# Patient Record
Sex: FEMALE | Race: WHITE | NOT HISPANIC OR LATINO | Employment: OTHER | URBAN - METROPOLITAN AREA
[De-identification: names, ages, dates, MRNs, and addresses within clinical notes are randomized per-mention and may not be internally consistent; named-entity substitution may affect disease eponyms.]

---

## 2017-01-17 ENCOUNTER — HOSPITAL ENCOUNTER (OUTPATIENT)
Dept: RADIOLOGY | Facility: MEDICAL CENTER | Age: 72
End: 2017-01-17
Attending: SURGERY
Payer: MEDICARE

## 2017-01-17 DIAGNOSIS — Z13.9 SCREENING: ICD-10-CM

## 2017-01-17 PROCEDURE — 77063 BREAST TOMOSYNTHESIS BI: CPT

## 2017-01-20 ENCOUNTER — HOSPITAL ENCOUNTER (OUTPATIENT)
Dept: RADIOLOGY | Facility: MEDICAL CENTER | Age: 72
End: 2017-01-20
Attending: SURGERY
Payer: MEDICARE

## 2017-01-20 DIAGNOSIS — R92.8 ABNORMAL MAMMOGRAM OF LEFT BREAST: ICD-10-CM

## 2017-01-20 PROCEDURE — G0206 DX MAMMO INCL CAD UNI: HCPCS | Mod: LT

## 2017-01-23 ENCOUNTER — HOSPITAL ENCOUNTER (OUTPATIENT)
Dept: RADIOLOGY | Facility: MEDICAL CENTER | Age: 72
End: 2017-01-23
Attending: SURGERY
Payer: MEDICARE

## 2017-01-23 ENCOUNTER — TELEPHONE (OUTPATIENT)
Dept: RADIOLOGY | Facility: MEDICAL CENTER | Age: 72
End: 2017-01-23

## 2017-01-26 ENCOUNTER — HOSPITAL ENCOUNTER (OUTPATIENT)
Dept: RADIOLOGY | Facility: MEDICAL CENTER | Age: 72
End: 2017-01-26
Attending: SURGERY
Payer: MEDICARE

## 2017-01-26 DIAGNOSIS — R92.1 BREAST CALCIFICATION, LEFT: ICD-10-CM

## 2017-01-26 PROCEDURE — A4648 IMPLANTABLE TISSUE MARKER: HCPCS

## 2017-01-26 PROCEDURE — 88305 TISSUE EXAM BY PATHOLOGIST: CPT

## 2017-02-15 ENCOUNTER — SLEEP CENTER VISIT (OUTPATIENT)
Dept: SLEEP MEDICINE | Facility: MEDICAL CENTER | Age: 72
End: 2017-02-15
Payer: MEDICARE

## 2017-02-15 VITALS
HEIGHT: 64 IN | HEART RATE: 79 BPM | RESPIRATION RATE: 16 BRPM | BODY MASS INDEX: 24.59 KG/M2 | WEIGHT: 144 LBS | SYSTOLIC BLOOD PRESSURE: 118 MMHG | DIASTOLIC BLOOD PRESSURE: 70 MMHG | OXYGEN SATURATION: 96 %

## 2017-02-15 DIAGNOSIS — G47.33 OSA (OBSTRUCTIVE SLEEP APNEA): ICD-10-CM

## 2017-02-15 DIAGNOSIS — C50.919 MALIGNANT NEOPLASM OF OTHER SPECIFIED SITES OF FEMALE BREAST: ICD-10-CM

## 2017-02-15 PROCEDURE — 99203 OFFICE O/P NEW LOW 30 MIN: CPT | Performed by: INTERNAL MEDICINE

## 2017-02-15 RX ORDER — ANASTROZOLE 1 MG/1
1 TABLET ORAL
COMMUNITY
Start: 2017-01-27 | End: 2023-11-18

## 2017-02-15 RX ORDER — ZOLPIDEM TARTRATE 5 MG/1
5 TABLET ORAL NIGHTLY PRN
Qty: 3 TAB | Refills: 0 | Status: SHIPPED | OUTPATIENT
Start: 2017-02-15 | End: 2019-05-09

## 2017-02-15 RX ORDER — SERTRALINE HYDROCHLORIDE 100 MG/1
1 TABLET, FILM COATED ORAL DAILY
COMMUNITY
Start: 2016-11-28

## 2017-02-15 NOTE — MR AVS SNAPSHOT
"Roseline Redding   2/15/2017 1:00 PM   Sleep Center Visit   MRN: [id number]    Department:  [facility]   Dept Phone:  [phone]    Description:  Female : 1945   Provider:  Noris Mo M.D.           Reason for Visit     New Patient Evaluate CLAUDIA      Allergies as of 2/15/2017     Allergen Noted Reactions    Codeine 2010       dizziness      You were diagnosed with     CLAUDIA (obstructive sleep apnea)   [194585]         Vital Signs     Blood Pressure Pulse Respirations Height Weight Body Mass Index    118/70 mmHg 79 16 1.626 m (5' 4.02\") 65.318 kg (144 lb) 24.71 kg/m2    Oxygen Saturation Smoking Status                96% Former Smoker          Basic Information     Date Of Birth Sex Race Ethnicity Preferred Language    1945 Female White Non- English      Your appointments     Mar 28, 2017  8:00 PM   Sleep Study Diagnostic with SLEEP TECH   [facility] (--)    [address]  [place] [employer] [employer] [place] [address]   [phone]            2017  2:40 PM   Follow UP with Noris Mo M.D.   [facility] (--)    [address]  [place] [place] [place] [place] [address]   [phone]            May 03, 2017 12:30 PM   US [place]EDEE with [address] BREAST [facility] [address]    [address]  [place] [address]   [phone]           Check in 30 minutes prior.              Problem List              ICD-10-CM Priority Class Noted - Resolved    Malignant neoplasm of other specified sites of female breast (CMS-HCC) C50.919   10/19/2012 - Present      Health Maintenance     Patient has no pending health maintenance at this time      Current Immunizations     No immunizations on file.      Below and/or attached are the medications your provider expects you to take. Review all of your home medications and newly ordered medications with your provider and/or pharmacist. Follow medication instructions " as directed by your provider and/or pharmacist. Please keep your medication list with you and share with your provider. Update the information when medications are discontinued, doses are changed, or new medications (including over-the-counter products) are added; and carry medication information at all times in the event of emergency situations     Allergies:  CODEINE - (reactions not documented)               Medications  Valid as of: February 15, 2017 -  1:52 PM    Generic Name Brand Name Tablet Size Instructions for use    Anastrozole (Tab) ARIMIDEX 1 MG Take 1 Tab by mouth every day.        Sertraline HCl (Tab) ZOLOFT 100 MG Take 1 Tab by mouth every day.        Zolpidem Tartrate (Tab) AMBIEN 5 MG Take 1 Tab by mouth at bedtime as needed for Sleep. Take up to 3 tablets at bedtime for sleep study        .                 Medicines prescribed today were sent to:     [id number] - [place], [place] - [address]    [address] [place] [address]    Phone: [phone] Fax: [phone]    Open 24 Hours?: No      Medication refill instructions:       If your prescription bottle indicates you have medication refills left, it is not necessary to call your provider’s office. Please contact your pharmacy and they will refill your medication.    If your prescription bottle indicates you do not have any refills left, you may request refills at any time through one of the following ways: The online [email or web address] system (except Urgent Care), by calling your provider’s office, or by asking your pharmacy to contact your provider’s office with a refill request. Medication refills are processed only during regular business hours and may not be available until the next business day. Your provider may request additional information or to have a follow-up visit with you prior to refilling your medication.   *Please Note: Medication refills are assigned a new Rx number when refilled electronically. Your pharmacy may indicate that no  refills were authorized even though a new prescription for the same medication is available at the pharmacy. Please request the medicine by name with the pharmacy before contacting your provider for a refill.        Your To Do List     Future Labs/Procedures Complete By Expires    POLYSOMNOGRAPHY, 4 OR MORE  As directed 2/15/2018         [employer] Access Code: [email or web address]  Expires: 3/17/2017  1:52 PM    [employer]  A secure, online tool to manage your health information     [employer]® is a secure, online tool that connects you to your personalized health information from the privacy of your home -- day or night - making it very easy for you to manage your healthcare. Once the activation process is completed, you can even access your medical information using the [employer] arianna, which is available for free in the Apple Arianna store or Google Play store.     [employer] provides the following levels of access (as shown below):   My Chart Features   [facility] Primary Care Doctor [facility]  Specialists [facility]  Urgent  Care Non-[facility]  Primary Care  Doctor   Email your healthcare team securely and privately 24/7 X X X    Manage appointments: schedule your next appointment; view details of past/upcoming appointments X      Request prescription refills. X      View recent personal medical records, including lab and immunizations X X X X   View health record, including health history, allergies, medications X X X X   Read reports about your outpatient visits, procedures, consult and ER notes X X X X   See your discharge summary, which is a recap of your hospital and/or ER visit that includes your diagnosis, lab results, and care plan. X X       How to register for [employer]  1. Go to  [email or web address].  2. Click on the Sign Up Now box, which takes you to the New Member Sign Up page. You will need to provide the following information:  a. Enter your [employer] Access Code exactly as it appears at the top of this page.  (You will not need to use this code after you’ve completed the sign-up process. If you do not sign up before the expiration date, you must request a new code.)   b. Enter your date of birth.   c. Enter your home email address.   d. Click Submit, and follow the next screen’s instructions.  3. Create a [employer] ID. This will be your [employer] login ID and cannot be changed, so think of one that is secure and easy to remember.  4. Create a [employer] password. You can change your password at any time.  5. Enter your Password Reset Question and Answer. This can be used at a later time if you forget your password.   6. Enter your e-mail address. This allows you to receive e-mail notifications when new information is available in [employer].  7. Click Sign Up. You can now view your health information.    For assistance activating your [employer] account, call [phone]

## 2017-02-15 NOTE — PROGRESS NOTES
Chief Complaint   Patient presents with   • New Patient     Evaluate CLAUDIA       HPI: This patient is a 71 y.o. Female who is referred by her dentist for obstructive sleep apnea syndrome. She has had bruxism for many years, and her dentist had her perform an overnight home sleep study which showed RDI of 32 events per hour, with no associated desaturations suggestive of sleep apnea. She admits to snoring for many years, and has been told by her daughter that she has apneas when they have shared a bedroom. She sleeps in a separate bedroom from her , and consequently is unaware of these symptoms. She goes to bed by 9 PM, falling asleep within 10 minutes with the use of melatonin. She has 1-2 nighttime awakenings, resumes sleep, gets up at 6 AM feeling tired. Her [place] sleepiness score is 6. She denies tobacco, excessive alcohol or caffeine use. Her BMI is 24.  She has researched into obstructive sleep apnea syndrome, and is concerned about the associated medical comorbidities including possible Alzheimer's disease.      Past Medical History   Diagnosis Date   • Unspecified disorder of thyroid    • CATARACT    • Pain    • Arrhythmia      MVP   • Glaucoma    • Indigestion    • Snoring    • Arthritis      knees/rt thumb   • Unspecified urinary incontinence      sneezing/coughing   • Cancer (CMS-HCC)      breast   • Heart valve disease      MVP   • Breast cancer (CMS-HCC)    • Allergic rhinitis    • Bronchitis    • Bruxism    • GERD (gastroesophageal reflux disease)    • Hypothyroidism    • Restless leg syndrome    • TMJ (dislocation of temporomandibular joint)    • Chickenpox    • Yemeni measles    • Influenza    • Mumps    • Tonsillitis    • Whooping cough        Social History     Social History   • Marital Status:      Spouse Name: N/A   • Number of Children: N/A   • Years of Education: N/A     Occupational History   • Not on file.     Social History Main Topics   • Smoking status: Former Smoker -- 1.50  "packs/day for 15 years     Types: Cigarettes     Quit date: 02/13/1983   • Smokeless tobacco: Never Used      Comment: 1.5 pks  a day for a total of 15 yrs   • Alcohol Use: Yes      Comment: Occasionally   • Drug Use: No   • Sexual Activity: Not on file     Other Topics Concern   • Not on file     Social History Narrative       Family History   Problem Relation Age of Onset   • Lung Cancer Father    • Hypertension Brother    • No Known Problems Daughter    • Sleep Apnea Neg Hx        No current outpatient prescriptions on file prior to visit.     No current facility-administered medications on file prior to visit.       Allergies: Codeine    ROS:   Constitutional: Denies fevers, chills, night sweats, fatigue or weight loss  Eyes: Denies vision loss, pain, drainage, double vision  Ears, Nose, Throat: Denies earache, difficulty hearing, tinnitus, nasal congestion, hoarseness, +bruxism  Cardiovascular: Denies chest pain, tightness, palpitations, orthopnea or edema  Respiratory: Denies shortness of breath, cough, wheezing, hemoptysis  Sleep: As in history of present illness  GI: Denies heartburn, dysphagia, nausea, abdominal pain, diarrhea or constipation  : Denies frequent urination, hematuria, discharge or painful urination  Musculoskeletal: Denies back pain, painful joints, sore muscles  Neurological: Denies weakness or headaches  Skin: No rashes    Blood pressure 118/70, pulse 79, resp. rate 16, height 1.626 m (5' 4.02\"), weight 65.318 kg (144 lb), SpO2 96 %.  Multi-Ox Readings  Multi Ox #1     O2 sat % at rest     O2 sat % on exertion     O2 sat average on exertion     Multi Ox #2     O2 sat % at rest     O2 sat % on exertion     O2 sat average on exertion       Oxygen Use     Oxygen Frequency     Duration of need     Is the patient mobile within the home?     CPAP Use?     BIPAP Use?     Servo Titration         Physical Exam:  Appearance: Well-nourished, well-developed, in no acute distress  HEENT: " Normocephalic, atraumatic, white sclera, PERRLA, oropharynx clear, Mallampati 3  Neck: No adenopathy or masses  Respiratory: no intercostal retractions or accessory muscle use  Lungs auscultation: Clear to auscultation bilaterally  Cardiovascular: Regular rate rhythm. No murmurs, rubs or gallops.  No LE edema  Abdomen: soft, nondistended  Gait: Normal  Digits: No clubbing, cyanosis  Motor: No focal deficits  Orientation: Oriented to time, person and place    Diagnosis:  1. CLAUDIA (obstructive sleep apnea)  POLYSOMNOGRAPHY, 4 OR MORE    zolpidem (AMBIEN) 5 MG Tab   2. Malignant neoplasm of other specified sites of female breast (CMS-HCC)         Plan:  The patient has snoring and witnessed apneas, in the setting of a crowded airway, with high clinical suspicion for obstructive sleep apnea syndrome. She had overnight screening home polysomnography performed by her dentist which was suggestive of severe CLAUDIA although no desaturations were appreciated.  We discussed the pathophysiology of sleep apnea, its relation to various other medical comorbidities including cardiovascular and neurologic disease, as well as treatment options.  She is very interested in pursuing evaluation, and we will arrange for polysomnography in the sleep laboratory for diagnosis and treatment.   Return for after testing.

## 2017-03-28 ENCOUNTER — APPOINTMENT (OUTPATIENT)
Dept: SLEEP MEDICINE | Facility: MEDICAL CENTER | Age: 72
End: 2017-03-28
Attending: INTERNAL MEDICINE
Payer: MEDICARE

## 2017-05-03 ENCOUNTER — HOSPITAL ENCOUNTER (OUTPATIENT)
Dept: RADIOLOGY | Facility: MEDICAL CENTER | Age: 72
End: 2017-05-03
Attending: SURGERY
Payer: COMMERCIAL

## 2017-05-03 DIAGNOSIS — R92.30 DENSE BREAST TISSUE: ICD-10-CM

## 2017-05-03 PROCEDURE — 4410555 US-SCREENING BREAST (SONOCINE - SP)

## 2017-05-08 ENCOUNTER — SLEEP STUDY (OUTPATIENT)
Dept: SLEEP MEDICINE | Facility: MEDICAL CENTER | Age: 72
End: 2017-05-08
Attending: INTERNAL MEDICINE
Payer: MEDICARE

## 2017-05-08 DIAGNOSIS — G47.33 OSA (OBSTRUCTIVE SLEEP APNEA): ICD-10-CM

## 2017-05-08 PROCEDURE — 95811 POLYSOM 6/>YRS CPAP 4/> PARM: CPT | Performed by: INTERNAL MEDICINE

## 2017-05-09 NOTE — PROCEDURES
CLINICAL COMMENTS:  The patient underwent a split night polysomnogram with a CPAP titration using the standard montage for measurement of parameters of sleep, respiratory events, movement abnormalities, heart rate and rhythm. A microphone was used to monitor snoring.    INTERPRETATION: The diagnostic recording time was 220.1 minutes with a sleep period of 186.2 minutes.  Total sleep time was 152.5 minutes with a sleep efficiency of 69.3%.  The sleep latency was 33.9 minutes, and REM latency was N/A minutes.  The patient had 98 arousals in total, for an arousal index of 38.6.        RESPIRATORY: The patient had 93 apneas in total.  Of these, 77 were obstructive apneas, and 15 were central apneas.  This resulted in an apnea index (AI) of 36.6.  The patient had 35 hypopneas in total, which resulted in a hypopnea index of 13.8.  The overall AHI was 50.4, while the AHI during REM was N/A.  The supine AHI = 111.7.    OXIMETRY: Oxygen saturation monitoring showed a mean SpO2 of 93.3% for the diagnostic part of the study, with a minimum oxygen saturation of 83.0%.  Oxygen saturations were below 89% for 4.2% of sleep time.    CARDIAC: The highest heart rate for the first part of the study was 88.0 beats per minute.  The average heart rate during sleep was 73.2 bpm, while the highest heart rate was 85.0 bpm.    LIMB MOVEMENTS: There were a total of 233 periodic limb movements during sleep, of which 5 were PLMS arousals.  This resulted in a PLMS index of 91.7 and a PLMS arousal index of 2.0.    TREATMENT    Treatment recording time was 190.5 minutes with a total sleep time of 152.0min.  The patient had an arousal index of 4.7.      RESPIRATORY: The patient had 1 obstructive apneas, 1 central apneas, and 13 hypopneas for an overall AHI was 5.9.    OXIMETRY: The mean SpO2 during treatment was 94.2%, with a minimum oxygen saturation of 83.0%.      CPAP was tried from 5cm to 6cmH2O.    In the diagnostic phase of the study the  patient had sleep efficiency of 69.3%. No REM sleep was noted. The patient had an increased arousal and awakening index. The patient had a limb movement index of 91.7 events per hour. The patient had an apnea hypotony index of 50.4 events per hour establishing the diagnosis of obstructive sleep apnea. She had mild hypoxemia with oxygen desaturations down to about 83% briefly.    The patient was then titrated on CPAP from 5-6 cm of water pressure. At 6 cm of water pressure CPAP the patient was able to achieve REM sleep with a respiratory disturbance index of 4.3 events per hour and improved oxygenation.    The patient is a candidate for continued airway pressurization therapy utilizing CPAP at 6 cm of water pressure.

## 2017-05-12 ENCOUNTER — SLEEP CENTER VISIT (OUTPATIENT)
Dept: SLEEP MEDICINE | Facility: MEDICAL CENTER | Age: 72
End: 2017-05-12
Payer: MEDICARE

## 2017-05-12 VITALS
SYSTOLIC BLOOD PRESSURE: 122 MMHG | WEIGHT: 147 LBS | HEIGHT: 64 IN | HEART RATE: 75 BPM | DIASTOLIC BLOOD PRESSURE: 72 MMHG | OXYGEN SATURATION: 94 % | RESPIRATION RATE: 16 BRPM | BODY MASS INDEX: 25.1 KG/M2

## 2017-05-12 DIAGNOSIS — G47.33 OSA (OBSTRUCTIVE SLEEP APNEA): ICD-10-CM

## 2017-05-12 PROCEDURE — 99213 OFFICE O/P EST LOW 20 MIN: CPT | Performed by: INTERNAL MEDICINE

## 2017-05-12 RX ORDER — LEVOTHYROXINE SODIUM 0.05 MG/1
50 TABLET ORAL DAILY
COMMUNITY
Start: 2017-04-03 | End: 2022-10-04

## 2017-05-12 NOTE — PROGRESS NOTES
Chief Complaint   Patient presents with   • Follow-Up     SS results   HPI: This patient is a 72 y.o. Female who returns to discuss sleep study results. She has had bruxism for many years, and her dentist had her perform an overnight home sleep study which showed RDI of 32 events per hour, with no associated desaturations, suggestive of sleep apnea. She admits to snoring for many years, and has been told by her daughter that she has apneas when they have shared a bedroom. Polysomnography in the sleep laboratory confirmed severe CLAUDIA with AHI 50 events per hour associated with mild hypoxemia with a brief desaturation into the 80s% Sp02. The patient was titrated on CPAP: 6 cm of water with resolution of AHI and hypoxemia.  She is very amenable to CPAP use.      Past Medical History   Diagnosis Date   • Unspecified disorder of thyroid    • CATARACT    • Pain    • Arrhythmia      MVP   • Glaucoma    • Indigestion    • Snoring    • Arthritis      knees/rt thumb   • Unspecified urinary incontinence      sneezing/coughing   • Cancer (CMS-HCC)      breast   • Heart valve disease      MVP   • Breast cancer (CMS-[facility])    • Allergic rhinitis    • Bronchitis    • Bruxism    • GERD (gastroesophageal reflux disease)    • Hypothyroidism    • Restless leg syndrome    • TMJ (dislocation of temporomandibular joint)    • Chickenpox    • Sudanese measles    • Influenza    • Mumps    • Tonsillitis    • Whooping cough        Social History     Social History   • Marital Status:      Spouse Name: N/A   • Number of Children: N/A   • Years of Education: N/A     Occupational History   • Not on file.     Social History Main Topics   • Smoking status: Former Smoker -- 1.50 packs/day for 15 years     Types: Cigarettes     Quit date: 02/13/1983   • Smokeless tobacco: Never Used      Comment: 1.5 pks  a day for a total of 15 yrs   • Alcohol Use: Yes      Comment: Occasionally   • Drug Use: No   • Sexual Activity: Not on file     Other Topics  "Concern   • Not on file     Social History Narrative       Family History   Problem Relation Age of Onset   • Lung Cancer Father    • Hypertension Brother    • No Known Problems Daughter    • Sleep Apnea Neg Hx        Current Outpatient Prescriptions on File Prior to Visit   Medication Sig Dispense Refill   • anastrozole (ARIMIDEX) 1 MG Tab Take 1 Tab by mouth every day.     • sertraline (ZOLOFT) 100 MG Tab Take 1 Tab by mouth every day.     • zolpidem (AMBIEN) 5 MG Tab Take 1 Tab by mouth at bedtime as needed for Sleep. Take up to 3 tablets at bedtime for sleep study 3 Tab 0     No current facility-administered medications on file prior to visit.       Allergies: Codeine    ROS:   Constitutional: Denies fevers, chills, night sweats, fatigue or weight loss  Eyes: Denies vision loss, pain, drainage, double vision  Ears, Nose, Throat: Denies earache, difficulty hearing, tinnitus, nasal congestion, hoarseness  Cardiovascular: Denies chest pain, tightness, palpitations, orthopnea or edema  Respiratory: Denies shortness of breath, cough, wheezing, hemoptysis  Sleep: As in history of present illness  GI: Denies heartburn, dysphagia, nausea, abdominal pain, diarrhea or constipation  : Denies frequent urination, hematuria, discharge or painful urination  Musculoskeletal: Denies back pain, painful joints, sore muscles  Neurological: Denies weakness or headaches  Skin: No rashes    Blood pressure 122/72, pulse 75, resp. rate 16, height 1.626 m (5' 4\"), weight 66.679 kg (147 lb), SpO2 94 %.  Multi-Ox Readings  Multi Ox #1     O2 sat % at rest     O2 sat % on exertion     O2 sat average on exertion     Multi Ox #2     O2 sat % at rest     O2 sat % on exertion     O2 sat average on exertion       Oxygen Use     Oxygen Frequency     Duration of need     Is the patient mobile within the home?     CPAP Use?     BIPAP Use?     Servo Titration         Physical Exam:  Appearance: Well-nourished, well-developed, in no acute " distress  HEENT: Normocephalic, atraumatic, white sclera, PERRLA, Mallampati 3  Neck: No masses  Respiratory: no intercostal retractions or accessory muscle use  Lungs auscultation: No audible wheezing  Cardiovascular: No LE edema  Abdomen: soft, nondistended  Gait: Normal  Digits: No clubbing, cyanosis  Motor: + Tremors  Orientation: Oriented to time, person and place    Diagnosis:  1. CLAUDIA (obstructive sleep apnea)         Plan:  The patient has severe obstructive sleep apnea, and would benefit from CPAP therapy. She is highly amenable to CPAP use. Set up CPAP: 6 cm of water using a small Dreamwear nasal mask with heated humidification.  We will download compliance card on follow-up to monitor response to therapy.  Return in about 8 weeks (around 7/7/2017).

## 2017-05-12 NOTE — MR AVS SNAPSHOT
"        Roseline Redding   2017 9:00 AM   Sleep Center Visit   MRN: [id number]    Department:  [facility]   Dept Phone:  [phone]    Description:  Female : 1945   Provider:  Noris Mo M.D.           Reason for Visit     Follow-Up SS results      Allergies as of 2017     Allergen Noted Reactions    Codeine 2010       dizziness      You were diagnosed with     CLAUDIA (obstructive sleep apnea)   [418332]         Vital Signs     Blood Pressure Pulse Respirations Height Weight Body Mass Index    122/72 mmHg 75 16 1.626 m (5' 4\") 66.679 kg (147 lb) 25.22 kg/m2    Oxygen Saturation Smoking Status                94% Former Smoker          Basic Information     Date Of Birth Sex Race Ethnicity Preferred Language    1945 Female White Non- English      Your appointments     May 23, 2017  1:00 PM   BONE DENSITY (DEXASCAN) with [facility] BD 1   [facility] ([address])    [address] [place] [address] [address]  [place] [address] [phone]           No calcium supplements 24 hours prior to exam, including antacids or multivitamins w/calcium.  Pt may eat and drink normally.  No injection procedures prior to Dexa on the same day.  No barium contrast, no CTs with IV or oral contrast, no Pet/CTs and no Nuc Med injections for 7 days prior to a Dexa (including Barium Swallow, Upper GI, Small Bowel Series).  If scheduling a Dexa on the same day as a CT with IV or oral contrast, any test with barium, Pet/CT or a Nuc Med with injection, always schedule the Dexa before the other study.            2017  1:20 PM   Follow UP with Noris Mo M.D.   [facility] (--)    [address]  [place] [id number] [phone]              Problem List              ICD-10-CM Priority Class Noted - Resolved    Malignant neoplasm of other specified sites of female breast C50.919   10/19/2012 - Present      Health Maintenance        Date " Due Completion Dates    IMM DTaP/Tdap/Td Vaccine (1 - Tdap) 2/27/1964 ---    PAP SMEAR 2/27/1966 ---    COLONOSCOPY 2/27/1995 ---    IMM ZOSTER VACCINE 2/27/2005 ---    IMM PNEUMOCOCCAL 65+ (ADULT) LOW/MEDIUM RISK SERIES (1 of 2 - PCV13) 2/27/2010 ---    MAMMOGRAM 1/20/2018 1/20/2017, 1/17/2017, 12/11/2015, 10/7/2014, 10/3/2013, 9/20/2012, 9/18/2012, 2/10/2011, 9/11/2009, 9/11/2009, 11/16/2007, 11/16/2007, 11/16/2006, 11/16/2006    BONE DENSITY 10/7/2019 10/7/2014            Current Immunizations     No immunizations on file.      Below and/or attached are the medications your provider expects you to take. Review all of your home medications and newly ordered medications with your provider and/or pharmacist. Follow medication instructions as directed by your provider and/or pharmacist. Please keep your medication list with you and share with your provider. Update the information when medications are discontinued, doses are changed, or new medications (including over-the-counter products) are added; and carry medication information at all times in the event of emergency situations     Allergies:  CODEINE - (reactions not documented)               Medications  Valid as of: May 12, 2017 -  9:40 AM    Generic Name Brand Name Tablet Size Instructions for use    Anastrozole (Tab) ARIMIDEX 1 MG Take 1 Tab by mouth every day.        Fexofenadine HCl   Take  by mouth.        Levothyroxine Sodium (Tab) SYNTHROID 50 MCG         Sertraline HCl (Tab) ZOLOFT 100 MG Take 1 Tab by mouth every day.        Zolpidem Tartrate (Tab) AMBIEN 5 MG Take 1 Tab by mouth at bedtime as needed for Sleep. Take up to 3 tablets at bedtime for sleep study        .                 Medicines prescribed today were sent to:     [id number] - [place] - [address]    [address] [place] [address]    Phone: [phone] Fax: [phone]    Open 24 Hours?: No      Medication refill instructions:       If your prescription bottle indicates you  have medication refills left, it is not necessary to call your provider’s office. Please contact your pharmacy and they will refill your medication.    If your prescription bottle indicates you do not have any refills left, you may request refills at any time through one of the following ways: The online Nexamp system (except Urgent Care), by calling your provider’s office, or by asking your pharmacy to contact your provider’s office with a refill request. Medication refills are processed only during regular business hours and may not be available until the next business day. Your provider may request additional information or to have a follow-up visit with you prior to refilling your medication.   *Please Note: Medication refills are assigned a new Rx number when refilled electronically. Your pharmacy may indicate that no refills were authorized even though a new prescription for the same medication is available at the pharmacy. Please request the medicine by name with the pharmacy before contacting your provider for a refill.           Nexamp Access Code: [email or web address]  Expires: 6/2/2017 12:13 PM    Nexamp  A secure, online tool to manage your health information     [facility]’s Nexamp® is a secure, online tool that connects you to your personalized health information from the privacy of your home -- day or night - making it very easy for you to manage your healthcare. Once the activation process is completed, you can even access your medical information using the Nexamp arianna, which is available for free in the Apple Arianna store or Google Play store.     Nexamp provides the following levels of access (as shown below):   My Chart Features   [facility] Doctor [facility]  Specialists [facility]  Urgent  Care Non-[facility]  Primary Care  Doctor   Email your healthcare team securely and privately 24/7 X X X    Manage appointments: schedule your next appointment; view details of past/upcoming appointments X       Request prescription refills. X      View recent personal medical records, including lab and immunizations X X X X   View health record, including health history, allergies, medications X X X X   Read reports about your outpatient visits, procedures, consult and ER notes X X X X   See your discharge summary, which is a recap of your hospital and/or ER visit that includes your diagnosis, lab results, and care plan. X X       How to register for IPextreme:  1. Go to  [email or web address].  2. Click on the Sign Up Now box, which takes you to the New Member Sign Up page. You will need to provide the following information:  a. Enter your IPextreme Access Code exactly as it appears at the top of this page. (You will not need to use this code after you’ve completed the sign-up process. If you do not sign up before the expiration date, you must request a new code.)   b. Enter your date of birth.   c. Enter your home email address.   d. Click Submit, and follow the next screen’s instructions.  3. Create a IPextreme ID. This will be your IPextreme login ID and cannot be changed, so think of one that is secure and easy to remember.  4. Create a IPextreme password. You can change your password at any time.  5. Enter your Password Reset Question and Answer. This can be used at a later time if you forget your password.   6. Enter your e-mail address. This allows you to receive e-mail notifications when new information is available in IPextreme.  7. Click Sign Up. You can now view your health information.    For assistance activating your IPextreme account, call [phone]

## 2017-05-23 ENCOUNTER — HOSPITAL ENCOUNTER (OUTPATIENT)
Dept: RADIOLOGY | Facility: MEDICAL CENTER | Age: 72
End: 2017-05-23
Attending: INTERNAL MEDICINE
Payer: MEDICARE

## 2017-05-23 DIAGNOSIS — M89.9 DISORDER OF BONE AND CARTILAGE, UNSPECIFIED: ICD-10-CM

## 2017-05-23 DIAGNOSIS — M94.9 DISORDER OF BONE AND CARTILAGE, UNSPECIFIED: ICD-10-CM

## 2017-05-23 PROCEDURE — 77080 DXA BONE DENSITY AXIAL: CPT

## 2017-07-26 ENCOUNTER — SLEEP CENTER VISIT (OUTPATIENT)
Dept: SLEEP MEDICINE | Facility: MEDICAL CENTER | Age: 72
End: 2017-07-26
Payer: MEDICARE

## 2017-07-26 VITALS
HEIGHT: 64 IN | DIASTOLIC BLOOD PRESSURE: 64 MMHG | RESPIRATION RATE: 16 BRPM | BODY MASS INDEX: 25.1 KG/M2 | OXYGEN SATURATION: 93 % | HEART RATE: 85 BPM | WEIGHT: 147 LBS | SYSTOLIC BLOOD PRESSURE: 110 MMHG

## 2017-07-26 DIAGNOSIS — G47.33 OSA ON CPAP: ICD-10-CM

## 2017-07-26 PROCEDURE — 99213 OFFICE O/P EST LOW 20 MIN: CPT | Performed by: INTERNAL MEDICINE

## 2017-07-26 NOTE — Clinical Note
Noris Mo M.D.  [facility]   [address] [place] [address]  Phone: [phone] - Fax: [phone]           Encounter Date: 7/26/2017  Provider: Noris Mo M.D.  Location of Care: [facility] SLEEP [facility]      Patient:   Roseline Redding   MR Number: [id number]   YOB: 1945     PROGRESS NOTE:  Chief Complaint   Patient presents with   • Follow-Up     CLAUDIA       HPI: This patient is a 72 y.o. Female who returns for follow-up of severe CLAUDIA. To reiterate, polysomnography in the sleep laboratory showed AHI of 50 events per hour associated with mild hypoxemia, consistent with severe CLAUDIA. She was started on CPAP: 6 cm of water and presents today for CPAP compliance check. Her compliance card confirms 100% CPAP usage for almost 6 hours nightly, and average AHI of 14.9. She is using nasal pillows however finds the headgear frequently slips off. She admits there is a learning curve to wearing CPAP, however is very motivated to continue CPAP use.   She require surgical clearance for orthopedic surgery.    Past Medical History   Diagnosis Date   • Unspecified disorder of thyroid    • CATARACT    • Pain    • Arrhythmia      MVP   • Glaucoma    • Indigestion    • Snoring    • Arthritis      knees/rt thumb   • Unspecified urinary incontinence      sneezing/coughing   • Cancer (CMS-HCC)      breast   • Heart valve disease      MVP   • Breast cancer (CMS-[facility])    • Allergic rhinitis    • Bronchitis    • Bruxism    • GERD (gastroesophageal reflux disease)    • Hypothyroidism    • Restless leg syndrome    • TMJ (dislocation of temporomandibular joint)    • Chickenpox    • Albanian measles    • Influenza    • Mumps    • Tonsillitis    • Whooping cough        Social History     Social History   • Marital Status:      Spouse Name: N/A   • Number of Children: N/A   • Years of Education: N/A     Occupational History   • Not on file.          Social History Main Topics   • Smoking status: Former Smoker -- 1.50 packs/day for 15 years     Types: Cigarettes     Quit date: 02/13/1983   • Smokeless tobacco: Never Used      Comment: 1.5 pks  a day for a total of 15 yrs   • Alcohol Use: Yes      Comment: Occasionally   • Drug Use: No   • Sexual Activity: Not on file     Other Topics Concern   • Not on file     Social History Narrative       Family History   Problem Relation Age of Onset   • Lung Cancer Father    • Hypertension Brother    • No Known Problems Daughter    • Sleep Apnea Neg Hx        Current Outpatient Prescriptions on File Prior to Visit   Medication Sig Dispense Refill   • levothyroxine (SYNTHROID) 50 MCG Tab      • Fexofenadine HCl (ALLEGRA PO) Take  by mouth.     • anastrozole (ARIMIDEX) 1 MG Tab Take 1 Tab by mouth every day.     • sertraline (ZOLOFT) 100 MG Tab Take 1 Tab by mouth every day.     • zolpidem (AMBIEN) 5 MG Tab Take 1 Tab by mouth at bedtime as needed for Sleep. Take up to 3 tablets at bedtime for sleep study 3 Tab 0     No current facility-administered medications on file prior to visit.       Allergies: Codeine    ROS:   Constitutional: Denies fevers, chills, night sweats, fatigue or weight loss  Eyes: Denies vision loss, pain, drainage, double vision  Ears, Nose, Throat: Denies earache, difficulty hearing, tinnitus, nasal congestion, hoarseness  Cardiovascular: Denies chest pain, tightness, palpitations, orthopnea or edema  Respiratory: Denies shortness of breath, cough, wheezing, hemoptysis  Sleep: Denies daytime sleepiness, snoring, apneas, insomnia, morning headaches  GI: Denies heartburn, dysphagia, nausea, abdominal pain, diarrhea or constipation  : Denies frequent urination, hematuria, discharge or painful urination  Musculoskeletal: Denies back pain, painful joints, sore muscles  Neurological: Denies weakness or headaches  Skin: No rashes    Blood pressure 110/64, pulse 85, resp. rate 16, height 1.626 m (5'  "4.02\"), weight 66.679 kg (147 lb), SpO2 93 %.    Physical Exam:  Appearance: Well-nourished, well-developed, in no acute distress  HEENT: Normocephalic, atraumatic, white sclera, PERRLA, Mallampati 3  Neck: No adenopathy or masses  Respiratory: no intercostal retractions or accessory muscle use  Lungs auscultation: Clear to auscultation bilaterally  Cardiovascular: Regular rate rhythm. No murmurs, rubs or gallops.  No LE edema  Abdomen: soft, nondistended  Gait: Normal  Digits: No clubbing, cyanosis  Motor: No focal deficits  Orientation: Oriented to time, person and place    Diagnosis:  1. CLAUDIA on CPAP         Plan:  Vanessa shows excellent compliance on CPAP therapy, with significant improvement in Apnea Hypopnea Index, however mild residual CLAUDIA. We will increase CPAP to 8 cm of water. Additionally we will downsize her headgear to improve CPAP mask fit.  She is considered stable for surgery and was encouraged to bring her CPAP machine in for use in the postoperative period.   Return in about 2 months (around 9/26/2017).            Electronically signed by Noris Mo M.D.  on 07/26/2017  Attn: Marlyn at [facility]  Heath Castro M.D.  [address]  [address]  [place] [address]  VIA Facsimile: [phone]                       "

## 2017-07-26 NOTE — MR AVS SNAPSHOT
"Roseline Redding   2017 1:20 PM   Sleep Center Visit   MRN: [id number]    Department:  [facility]   Dept Phone:  [phone]    Description:  Female : 1945   Provider:  Noris Mo M.D.           Reason for Visit     Follow-Up CLAUDIA      Allergies as of 2017     Allergen Noted Reactions    Codeine 2010       dizziness      You were diagnosed with     CLAUDIA on CPAP   [[id number]]         Vital Signs     Blood Pressure Pulse Respirations Height Weight Body Mass Index    110/64 mmHg 85 16 1.626 m (5' 4.02\") 66.679 kg (147 lb) 25.22 kg/m2    Oxygen Saturation Smoking Status                93% Former Smoker          Basic Information     Date Of Birth Sex Race Ethnicity Preferred Language    1945 Female White Non- English      Your appointments     Aug 01, 2017 12:50 PM   MA SCRN10 with [facility] MG 3   [facility] ([address])    [address] [place] [address] [address]  [place] [address] [phone]           No deodorant, powder, perfume or lotion under the arm or breast area.            Sep 21, 2017  3:00 PM   Follow UP with Noris Mo M.D.   [facility] (--)    [address] [place] [address] [address]  [place] [address] [phone]              Problem List              ICD-10-CM Priority Class Noted - Resolved    Malignant neoplasm of other specified sites of female breast C50.919   10/19/2012 - Present      Health Maintenance        Date Due Completion Dates    IMM DTaP/Tdap/Td Vaccine (1 - Tdap) 1964 ---    PAP SMEAR 1966 ---    COLONOSCOPY 1995 ---    IMM ZOSTER VACCINE 2005 ---    IMM PNEUMOCOCCAL 65+ (ADULT) LOW/MEDIUM RISK SERIES (1 of 2 - PCV13) 2010 ---    IMM INFLUENZA (1) 2017 ---    MAMMOGRAM 2018, 2017, 2015, 10/7/2014, 10/3/2013, 2012, 2012, 2/10/2011, 2009, 2009, 2007, 2007, 2006, 2006    BONE DENSITY 2022, " 10/7/2014            Current Immunizations     No immunizations on file.      Below and/or attached are the medications your provider expects you to take. Review all of your home medications and newly ordered medications with your provider and/or pharmacist. Follow medication instructions as directed by your provider and/or pharmacist. Please keep your medication list with you and share with your provider. Update the information when medications are discontinued, doses are changed, or new medications (including over-the-counter products) are added; and carry medication information at all times in the event of emergency situations     Allergies:  CODEINE - (reactions not documented)               Medications  Valid as of: July 26, 2017 -  1:55 PM    Generic Name Brand Name Tablet Size Instructions for use    Anastrozole (Tab) ARIMIDEX 1 MG Take 1 Tab by mouth every day.        Fexofenadine HCl   Take  by mouth.        Levothyroxine Sodium (Tab) SYNTHROID 50 MCG         Sertraline HCl (Tab) ZOLOFT 100 MG Take 1 Tab by mouth every day.        Zolpidem Tartrate (Tab) AMBIEN 5 MG Take 1 Tab by mouth at bedtime as needed for Sleep. Take up to 3 tablets at bedtime for sleep study        .                 Medicines prescribed today were sent to:     [facility] - [place], [place] - [address]    [address] [place] [address]    Phone: [phone] Fax: [phone]    Open 24 Hours?: No      Medication refill instructions:       If your prescription bottle indicates you have medication refills left, it is not necessary to call your provider’s office. Please contact your pharmacy and they will refill your medication.    If your prescription bottle indicates you do not have any refills left, you may request refills at any time through one of the following ways: The online Bandtastic system (except Urgent Care), by calling your provider’s office, or by asking your pharmacy to contact your provider’s office with a refill  request. Medication refills are processed only during regular business hours and may not be available until the next business day. Your provider may request additional information or to have a follow-up visit with you prior to refilling your medication.   *Please Note: Medication refills are assigned a new Rx number when refilled electronically. Your pharmacy may indicate that no refills were authorized even though a new prescription for the same medication is available at the pharmacy. Please request the medicine by name with the pharmacy before contacting your provider for a refill.           Healthagen Access Code: [email or web address]  Expires: 8/25/2017  1:23 PM    Healthagen  A secure, online tool to manage your health information     [employer]’s Healthagen® is a secure, online tool that connects you to your personalized health information from the privacy of your home -- day or night - making it very easy for you to manage your healthcare. Once the activation process is completed, you can even access your medical information using the Healthagen arianna, which is available for free in the Apple Arianna store or Google Play store.     Healthagen provides the following levels of access (as shown below):   My Chart Features   [facility] Doctor [facility]  Specialists [facility]  Urgent  Care [facility]  Primary Care  Doctor   Email your healthcare team securely and privately 24/7 X X X    Manage appointments: schedule your next appointment; view details of past/upcoming appointments X      Request prescription refills. X      View recent personal medical records, including lab and immunizations X X X X   View health record, including health history, allergies, medications X X X X   Read reports about your outpatient visits, procedures, consult and ER notes X X X X   See your discharge summary, which is a recap of your hospital and/or ER visit that includes your diagnosis, lab results, and care plan. X X       How to register for  MyChart:  1. Go to  [email or web address].  2. Click on the Sign Up Now box, which takes you to the New Member Sign Up page. You will need to provide the following information:  a. Enter your [employer] Fortress Access Code exactly as it appears at the top of this page. (You will not need to use this code after you’ve completed the sign-up process. If you do not sign up before the expiration date, you must request a new code.)   b. Enter your date of birth.   c. Enter your home email address.   d. Click Submit, and follow the next screen’s instructions.  3. Create a Isogenicat ID. This will be your Digital Fortress login ID and cannot be changed, so think of one that is secure and easy to remember.  4. Create a Isogenicat password. You can change your password at any time.  5. Enter your Password Reset Question and Answer. This can be used at a later time if you forget your password.   6. Enter your e-mail address. This allows you to receive e-mail notifications when new information is available in Digital Fortress.  7. Click Sign Up. You can now view your health information.    For assistance activating your Digital Fortress account, call [phone]

## 2017-08-16 ENCOUNTER — TELEPHONE (OUTPATIENT)
Dept: RADIOLOGY | Facility: MEDICAL CENTER | Age: 72
End: 2017-08-16

## 2017-08-16 NOTE — TELEPHONE ENCOUNTER
LM to conf apt @ [facility] on 8/17 @ 9:30 check in @ 9:15, reviewed prep and location

## 2017-08-17 ENCOUNTER — APPOINTMENT (OUTPATIENT)
Dept: RADIOLOGY | Facility: MEDICAL CENTER | Age: 72
End: 2017-08-17
Attending: SURGERY
Payer: MEDICARE

## 2017-08-30 ENCOUNTER — TELEPHONE (OUTPATIENT)
Dept: RADIOLOGY | Facility: MEDICAL CENTER | Age: 72
End: 2017-08-30

## 2017-08-31 ENCOUNTER — HOSPITAL ENCOUNTER (OUTPATIENT)
Dept: RADIOLOGY | Facility: MEDICAL CENTER | Age: 72
End: 2017-08-31
Attending: SURGERY
Payer: MEDICARE

## 2017-08-31 DIAGNOSIS — Z85.3 PERSONAL HISTORY OF MALIGNANT NEOPLASM OF BREAST: ICD-10-CM

## 2017-08-31 DIAGNOSIS — R92.1 BREAST CALCIFICATION, LEFT: ICD-10-CM

## 2017-08-31 PROCEDURE — G0279 TOMOSYNTHESIS, MAMMO: HCPCS

## 2017-09-21 ENCOUNTER — SLEEP CENTER VISIT (OUTPATIENT)
Dept: SLEEP MEDICINE | Facility: MEDICAL CENTER | Age: 72
End: 2017-09-21
Payer: MEDICARE

## 2017-09-21 VITALS
DIASTOLIC BLOOD PRESSURE: 74 MMHG | HEART RATE: 78 BPM | HEIGHT: 64 IN | TEMPERATURE: 97.7 F | SYSTOLIC BLOOD PRESSURE: 118 MMHG | RESPIRATION RATE: 16 BRPM | WEIGHT: 150.2 LBS | BODY MASS INDEX: 25.64 KG/M2 | OXYGEN SATURATION: 95 %

## 2017-09-21 DIAGNOSIS — G47.33 OSA ON CPAP: ICD-10-CM

## 2017-09-21 PROCEDURE — 99213 OFFICE O/P EST LOW 20 MIN: CPT | Performed by: INTERNAL MEDICINE

## 2017-09-21 RX ORDER — EFINACONAZOLE 100 MG/ML
SOLUTION TOPICAL
COMMUNITY
Start: 2017-07-29 | End: 2021-06-07

## 2017-09-21 NOTE — PROGRESS NOTES
Chief Complaint   Patient presents with   • Follow-Up     CLAUDIA follow up     HPI: This patient is a 72 y.o. Female who returns for follow-up of severe CLAUDIA. To reiterate, polysomnography in the sleep laboratory showed AHI of 50 events per hour associated with mild hypoxemia, consistent with severe CLAUDIA. She was started on CPAP: 6 cm of water. Over the past 3 months she acquired a new puppy who has been sleeping with her, and she had initially reduced her CPAP use. Compliance card shows 47% usage only. When she uses CPAP she averages 5 hours of use on pressures of 8 cm of water. Her AHI was 13. She is using nasal pillows however finds the headgear frequently slips off. She appears motivated to resume CPAP and compliance card confirms that she has resumed treatment over this past month.    Past Medical History:   Diagnosis Date   • Allergic rhinitis    • Arrhythmia     MVP   • Arthritis     knees/rt thumb   • Breast cancer (CMS-HCC)    • Bronchitis    • Bruxism    • Cancer (CMS-HCC)     breast   • CATARACT    • Chickenpox    • GERD (gastroesophageal reflux disease)    • [place]ic measles    • Glaucoma    • Heart valve disease     MVP   • Hypothyroidism    • Indigestion    • Influenza    • Mumps    • Pain    • Restless leg syndrome    • Snoring    • TMJ (dislocation of temporomandibular joint)    • Tonsillitis    • Unspecified disorder of thyroid    • Unspecified urinary incontinence     sneezing/coughing   • Whooping cough        Social History     Social History   • Marital status:      Spouse name: N/A   • Number of children: N/A   • Years of education: N/A     Occupational History   • Not on file.     Social History Main Topics   • Smoking status: Former Smoker     Packs/day: 1.50     Years: 15.00     Types: Cigarettes     Quit date: 2/13/1983   • Smokeless tobacco: Never Used      Comment: 1.5 pks  a day for a total of 15 yrs   • Alcohol use Yes      Comment: Occasionally   • Drug use: No   • Sexual activity: Not  "on file     Other Topics Concern   • Not on file     Social History Narrative   • No narrative on file       Family History   Problem Relation Age of Onset   • Lung Cancer Father    • Hypertension Brother    • No Known Problems Daughter    • Sleep Apnea Neg Hx        Current Outpatient Prescriptions on File Prior to Visit   Medication Sig Dispense Refill   • levothyroxine (SYNTHROID) 50 MCG Tab      • Fexofenadine HCl (ALLEGRA PO) Take  by mouth.     • anastrozole (ARIMIDEX) 1 MG Tab Take 1 Tab by mouth every day.     • sertraline (ZOLOFT) 100 MG Tab Take 1 Tab by mouth every day.     • zolpidem (AMBIEN) 5 MG Tab Take 1 Tab by mouth at bedtime as needed for Sleep. Take up to 3 tablets at bedtime for sleep study 3 Tab 0     No current facility-administered medications on file prior to visit.        Allergies: Codeine    ROS:   Constitutional: Denies fevers, chills, night sweats, fatigue or weight loss  Eyes: Denies vision loss, pain, drainage, double vision  Ears, Nose, Throat: Denies earache, difficulty hearing, tinnitus, nasal congestion, hoarseness  Cardiovascular: Denies chest pain, tightness, palpitations, orthopnea or edema  Respiratory: Denies shortness of breath, cough, wheezing, hemoptysis  Sleep: Denies daytime sleepiness, snoring, apneas, insomnia, morning headaches  GI: Denies heartburn, dysphagia, nausea, abdominal pain, diarrhea or constipation  : Denies frequent urination, hematuria, discharge or painful urination  Musculoskeletal: Denies back pain, painful joints, sore muscles  Neurological: Denies weakness or headaches  Skin: No rashes    Blood pressure 118/74, pulse 78, temperature 36.5 °C (97.7 °F), resp. rate 16, height 1.626 m (5' 4\"), weight 68.1 kg (150 lb 3.2 oz), SpO2 95 %.    Physical Exam:  Appearance: Well-nourished, well-developed, in no acute distress  HEENT: Normocephalic, atraumatic, white sclera, PERRLA, oropharynx clear  Neck: No adenopathy or masses  Respiratory: no intercostal " retractions or accessory muscle use  Lungs auscultation: Clear to auscultation bilaterally  Cardiovascular: Regular rate rhythm. No murmurs, rubs or gallops.  No LE edema  Abdomen: soft, nondistended  Gait: Normal  Digits: No clubbing, cyanosis  Motor: No focal deficits  Orientation: Oriented to time, person and place    Diagnosis:  1. CLAUDIA on CPAP         Plan:  Mask refit provided today. She was encouraged to use CPAP 8 cm of water nightly for minimal 4 hours.  Return in about 6 months (around 3/21/2018) for follow up visit with Noris Mo MD.

## 2017-10-21 ENCOUNTER — HOSPITAL ENCOUNTER (OUTPATIENT)
Facility: MEDICAL CENTER | Age: 72
End: 2017-10-21
Attending: PHYSICIAN ASSISTANT
Payer: MEDICARE

## 2017-10-21 ENCOUNTER — OFFICE VISIT (OUTPATIENT)
Dept: URGENT CARE | Facility: CLINIC | Age: 72
End: 2017-10-21
Payer: MEDICARE

## 2017-10-21 VITALS
SYSTOLIC BLOOD PRESSURE: 126 MMHG | OXYGEN SATURATION: 95 % | RESPIRATION RATE: 16 BRPM | TEMPERATURE: 98.9 F | WEIGHT: 145 LBS | HEIGHT: 63 IN | DIASTOLIC BLOOD PRESSURE: 82 MMHG | BODY MASS INDEX: 25.69 KG/M2 | HEART RATE: 91 BPM

## 2017-10-21 DIAGNOSIS — N30.90 CYSTITIS: ICD-10-CM

## 2017-10-21 LAB
APPEARANCE UR: NORMAL
BILIRUB UR STRIP-MCNC: NORMAL MG/DL
COLOR UR AUTO: YELLOW
GLUCOSE UR STRIP.AUTO-MCNC: NORMAL MG/DL
KETONES UR STRIP.AUTO-MCNC: NORMAL MG/DL
LEUKOCYTE ESTERASE UR QL STRIP.AUTO: NORMAL
NITRITE UR QL STRIP.AUTO: NORMAL
PH UR STRIP.AUTO: 6.5 [PH] (ref 5–8)
PROT UR QL STRIP: 100 MG/DL
RBC UR QL AUTO: NORMAL
SP GR UR STRIP.AUTO: 1.03
UROBILINOGEN UR STRIP-MCNC: NORMAL MG/DL

## 2017-10-21 PROCEDURE — 81002 URINALYSIS NONAUTO W/O SCOPE: CPT | Performed by: PHYSICIAN ASSISTANT

## 2017-10-21 PROCEDURE — 87186 SC STD MICRODIL/AGAR DIL: CPT

## 2017-10-21 PROCEDURE — 99000 SPECIMEN HANDLING OFFICE-LAB: CPT | Performed by: PHYSICIAN ASSISTANT

## 2017-10-21 PROCEDURE — 87086 URINE CULTURE/COLONY COUNT: CPT

## 2017-10-21 PROCEDURE — 99202 OFFICE O/P NEW SF 15 MIN: CPT | Performed by: PHYSICIAN ASSISTANT

## 2017-10-21 PROCEDURE — 87077 CULTURE AEROBIC IDENTIFY: CPT

## 2017-10-21 RX ORDER — CEFUROXIME AXETIL 500 MG/1
500 TABLET ORAL 2 TIMES DAILY
Qty: 10 TAB | Refills: 0 | Status: SHIPPED | OUTPATIENT
Start: 2017-10-21 | End: 2017-10-26

## 2017-10-21 RX ORDER — PHENAZOPYRIDINE HYDROCHLORIDE 200 MG/1
200 TABLET, FILM COATED ORAL 3 TIMES DAILY PRN
Qty: 6 TAB | Refills: 0 | Status: SHIPPED | OUTPATIENT
Start: 2017-10-21 | End: 2019-05-09

## 2017-10-21 ASSESSMENT — ENCOUNTER SYMPTOMS
FLANK PAIN: 0
ABDOMINAL PAIN: 1
CONSTITUTIONAL NEGATIVE: 1
MUSCULOSKELETAL NEGATIVE: 1
FEVER: 0

## 2017-10-21 NOTE — PROGRESS NOTES
"Subjective:      Roseline Redding is a 72 y.o. female who presents with Urinary Frequency (x today, urinary frequency, burning with urination and blood in urine)            Urinary Frequency   This is a new problem. The current episode started today. The problem occurs constantly. The problem has been unchanged. Associated symptoms include abdominal pain and urinary symptoms. Pertinent negatives include no fever. Nothing aggravates the symptoms. She has tried nothing for the symptoms. The treatment provided no relief.       Review of Systems   Constitutional: Negative.  Negative for fever.   Gastrointestinal: Positive for abdominal pain.   Genitourinary: Positive for dysuria, frequency and urgency. Negative for flank pain and hematuria.   Musculoskeletal: Negative.    Skin: Negative.           Objective:     /82   Pulse 91   Temp 37.2 °C (98.9 °F)   Resp 16   Ht 1.6 m (5' 3\")   Wt 65.8 kg (145 lb)   SpO2 95%   BMI 25.69 kg/m²      Physical Exam   Constitutional: She is oriented to person, place, and time. She appears well-developed and well-nourished. No distress.   Abdominal: She exhibits no distension. There is no tenderness (no cvat).   Neurological: She is alert and oriented to person, place, and time.   Skin: Skin is warm and dry.   Psychiatric: She has a normal mood and affect. Her behavior is normal. Judgment and thought content normal.   Nursing note and vitals reviewed.    Vitals:    10/21/17 1430   BP: 126/82   Pulse: 91   Resp: 16   Temp: 37.2 °C (98.9 °F)   SpO2: 95%   Weight: 65.8 kg (145 lb)   Height: 1.6 m (5' 3\")     Active Ambulatory Problems     Diagnosis Date Noted   • Malignant neoplasm of other specified sites of female breast 10/19/2012     Resolved Ambulatory Problems     Diagnosis Date Noted   • No Resolved Ambulatory Problems     Past Medical History:   Diagnosis Date   • Allergic rhinitis    • Arrhythmia    • Arthritis    • Breast cancer (CMS-HCC)    • Bronchitis    • " Bruxism    • Cancer (CMS-HCC)    • CATARACT    • Chickenpox    • GERD (gastroesophageal reflux disease)    • [place] measles    • Glaucoma    • Heart valve disease    • Hypothyroidism    • Indigestion    • Influenza    • Mumps    • Pain    • Restless leg syndrome    • Snoring    • TMJ (dislocation of temporomandibular joint)    • Tonsillitis    • Unspecified disorder of thyroid    • Unspecified urinary incontinence    • Whooping cough      Current Outpatient Prescriptions on File Prior to Visit   Medication Sig Dispense Refill   • JUBLIA 10 % Solution      • levothyroxine (SYNTHROID) 50 MCG Tab      • Fexofenadine HCl (ALLEGRA PO) Take  by mouth.     • anastrozole (ARIMIDEX) 1 MG Tab Take 1 Tab by mouth every day.     • sertraline (ZOLOFT) 100 MG Tab Take 1 Tab by mouth every day.     • zolpidem (AMBIEN) 5 MG Tab Take 1 Tab by mouth at bedtime as needed for Sleep. Take up to 3 tablets at bedtime for sleep study 3 Tab 0     No current facility-administered medications on file prior to visit.      Gargles, Cepacol lozenges, Aleve/Advil as needed for throat pain  Family History   Problem Relation Age of Onset   • Lung Cancer Father    • Hypertension Brother    • No Known Problems Daughter    • Sleep Apnea Neg Hx      Codeine         ua+     Assessment/Plan:     ·  uti      · rx abx; cx

## 2017-10-22 DIAGNOSIS — N30.90 CYSTITIS: ICD-10-CM

## 2017-10-24 LAB
BACTERIA UR CULT: ABNORMAL
SIGNIFICANT IND 70042: ABNORMAL
SITE SITE: ABNORMAL
SOURCE SOURCE: ABNORMAL

## 2018-01-09 ENCOUNTER — TELEPHONE (OUTPATIENT)
Dept: SLEEP MEDICINE | Facility: MEDICAL CENTER | Age: 73
End: 2018-01-09

## 2018-01-09 NOTE — TELEPHONE ENCOUNTER
Called and left message for patient advised letter is ready to be picked up at either location, it just needs to be printed from the letter button.

## 2018-01-09 NOTE — TELEPHONE ENCOUNTER
Although it is not required by [employer] per their website, Pt would like a letter to carry on her CPAP machine and stating that she has CLAUDIA and needs to travel w/ her machine. Can you please write something up for her??  Will call her when complete for .    Routed to Dr. Mo and sujit.

## 2018-01-19 ENCOUNTER — APPOINTMENT (OUTPATIENT)
Dept: RADIOLOGY | Facility: MEDICAL CENTER | Age: 73
End: 2018-01-19
Attending: SURGERY
Payer: MEDICARE

## 2018-01-29 ENCOUNTER — HOSPITAL ENCOUNTER (OUTPATIENT)
Dept: RADIOLOGY | Facility: MEDICAL CENTER | Age: 73
End: 2018-01-29
Attending: SURGERY
Payer: MEDICARE

## 2018-01-29 DIAGNOSIS — Z12.31 SCREENING MAMMOGRAM, ENCOUNTER FOR: ICD-10-CM

## 2018-01-29 PROCEDURE — 77067 SCR MAMMO BI INCL CAD: CPT

## 2018-02-01 ENCOUNTER — HOSPITAL ENCOUNTER (OUTPATIENT)
Dept: RADIOLOGY | Facility: MEDICAL CENTER | Age: 73
End: 2018-02-01
Attending: SURGERY
Payer: MEDICARE

## 2018-02-01 DIAGNOSIS — R92.8 ABNORMAL MAMMOGRAM OF RIGHT BREAST: ICD-10-CM

## 2018-02-01 PROCEDURE — 77065 DX MAMMO INCL CAD UNI: CPT | Mod: RT

## 2018-02-01 PROCEDURE — 76642 ULTRASOUND BREAST LIMITED: CPT | Mod: RT

## 2018-02-08 ENCOUNTER — TELEPHONE (OUTPATIENT)
Dept: RADIOLOGY | Facility: MEDICAL CENTER | Age: 73
End: 2018-02-08

## 2018-02-09 ENCOUNTER — HOSPITAL ENCOUNTER (OUTPATIENT)
Dept: RADIOLOGY | Facility: MEDICAL CENTER | Age: 73
End: 2018-02-09
Attending: SURGERY
Payer: MEDICARE

## 2018-02-09 DIAGNOSIS — R92.8 ABNORMAL FINDINGS ON DIAGNOSTIC IMAGING OF BREAST: ICD-10-CM

## 2018-03-28 ENCOUNTER — SLEEP CENTER VISIT (OUTPATIENT)
Dept: SLEEP MEDICINE | Facility: MEDICAL CENTER | Age: 73
End: 2018-03-28
Payer: MEDICARE

## 2018-03-28 VITALS
HEIGHT: 63 IN | DIASTOLIC BLOOD PRESSURE: 70 MMHG | HEART RATE: 73 BPM | WEIGHT: 150 LBS | RESPIRATION RATE: 16 BRPM | OXYGEN SATURATION: 93 % | BODY MASS INDEX: 26.58 KG/M2 | SYSTOLIC BLOOD PRESSURE: 108 MMHG

## 2018-03-28 DIAGNOSIS — G47.33 OSA ON CPAP: ICD-10-CM

## 2018-03-28 PROCEDURE — 99213 OFFICE O/P EST LOW 20 MIN: CPT | Performed by: INTERNAL MEDICINE

## 2018-03-28 NOTE — PROGRESS NOTES
Chief Complaint   Patient presents with   • Follow-Up     CLAUDIA, 6 month follow-up     HPI: Vanessa is a 73 y.o. Female who returns for follow-up of severe CLAUDIA. To reiterate, polysomnography in the sleep laboratory showed AHI of 50 events per hour associated with mild hypoxemia, consistent with severe CLAUDIA. She has been compliant with CPAP: 8 cm of water. Approximately 6 months ago she acquired a new puppy, Shahida,  who has been sleeping with her, and she had reduced her CPAP use. She has since resumed CPAP 97% of the time for 6 hours nightly. Her average AHI is 9.9. She is sleeping well and waking rested. She denies daytime hypersomnolence. Weight has been stable.  She requires a new nasal pillow/headgear.    Past Medical History:   Diagnosis Date   • Allergic rhinitis    • Arrhythmia     MVP   • Arthritis     knees/rt thumb   • Breast cancer (CMS-HCC)    • Bronchitis    • Bruxism    • Cancer (CMS-HCC)     breast   • CATARACT    • Chickenpox    • GERD (gastroesophageal reflux disease)    • Vincentian measles    • Glaucoma    • Heart valve disease     MVP   • Hypothyroidism    • Indigestion    • Influenza    • Mumps    • Pain    • Restless leg syndrome    • Snoring    • TMJ (dislocation of temporomandibular joint)    • Tonsillitis    • Unspecified disorder of thyroid    • Unspecified urinary incontinence     sneezing/coughing   • Whooping cough        Social History     Social History   • Marital status:      Spouse name: N/A   • Number of children: N/A   • Years of education: N/A     Occupational History   • Not on file.     Social History Main Topics   • Smoking status: Former Smoker     Packs/day: 1.50     Years: 15.00     Types: Cigarettes     Quit date: 2/13/1983   • Smokeless tobacco: Never Used      Comment: 1.5 pks  a day for a total of 15 yrs   • Alcohol use Yes      Comment: Occasionally   • Drug use: No   • Sexual activity: Not on file     Other Topics Concern   • Not on file     Social History Narrative   •  "No narrative on file       Family History   Problem Relation Age of Onset   • Lung Cancer Father    • Hypertension Brother    • No Known Problems Daughter    • Sleep Apnea Neg Hx        Current Outpatient Prescriptions on File Prior to Visit   Medication Sig Dispense Refill   • JUBLIA 10 % Solution      • levothyroxine (SYNTHROID) 50 MCG Tab      • Fexofenadine HCl (ALLEGRA PO) Take  by mouth.     • anastrozole (ARIMIDEX) 1 MG Tab Take 1 Tab by mouth every day.     • sertraline (ZOLOFT) 100 MG Tab Take 1 Tab by mouth every day.     • phenazopyridine (PYRIDIUM) 200 MG Tab Take 1 Tab by mouth 3 times a day as needed (for painful urination). 6 Tab 0   • zolpidem (AMBIEN) 5 MG Tab Take 1 Tab by mouth at bedtime as needed for Sleep. Take up to 3 tablets at bedtime for sleep study 3 Tab 0     No current facility-administered medications on file prior to visit.        Allergies: Codeine    ROS:   Constitutional: Denies fevers, chills, night sweats, fatigue or weight loss  Eyes: Denies vision loss, pain, drainage, double vision  Ears, Nose, Throat: Denies earache, difficulty hearing, tinnitus, nasal congestion, hoarseness  Cardiovascular: Denies chest pain, tightness, palpitations, orthopnea or edema  Respiratory: Denies shortness of breath, cough, wheezing, hemoptysis  Sleep: Denies daytime sleepiness, snoring, apneas, insomnia, morning headaches  GI: Denies heartburn, dysphagia, nausea, abdominal pain, diarrhea or constipation  : Denies frequent urination, hematuria, discharge or painful urination  Musculoskeletal: Denies back pain, painful joints, sore muscles  Neurological: Denies weakness or headaches  Skin: No rashes    Blood pressure 108/70, pulse 73, resp. rate 16, height 1.6 m (5' 3\"), weight 68 kg (150 lb), SpO2 93 %.    Physical Exam:  Appearance: Well-nourished, well-developed, in no acute distress  HEENT: Normocephalic, atraumatic, white sclera, PERRLA, Mallampati 3  Neck: No adenopathy or " masses  Respiratory: no intercostal retractions or accessory muscle use  Lungs auscultation: Clear to auscultation bilaterally  Cardiovascular: Regular rate rhythm. No murmurs, rubs or gallops.  No LE edema  Abdomen: soft, nondistended  Gait: Normal  Digits: No clubbing, cyanosis  Motor: No focal deficits  Orientation: Oriented to time, person and place    Diagnosis:  1. CLAUDIA on CPAP     2. BMI 26.0-26.9,adult         Plan:  Vanessa shows excellent compliance with CPAP. Replace nasal pillows. Her AHI remains below 10, however we will empirically increase her pressures if her AHI increases.  RTC 6 months.  No Follow-up on file.

## 2018-05-18 ENCOUNTER — HOSPITAL ENCOUNTER (OUTPATIENT)
Dept: RADIOLOGY | Facility: MEDICAL CENTER | Age: 73
End: 2018-05-18
Attending: SURGERY
Payer: MEDICARE

## 2018-05-18 DIAGNOSIS — R92.30 DENSE BREAST TISSUE: ICD-10-CM

## 2018-05-18 PROCEDURE — 76641 ULTRASOUND BREAST COMPLETE: CPT

## 2018-09-28 ENCOUNTER — TELEPHONE (OUTPATIENT)
Dept: SLEEP MEDICINE | Facility: MEDICAL CENTER | Age: 73
End: 2018-09-28

## 2018-09-28 ENCOUNTER — SLEEP CENTER VISIT (OUTPATIENT)
Dept: SLEEP MEDICINE | Facility: MEDICAL CENTER | Age: 73
End: 2018-09-28
Payer: MEDICARE

## 2018-09-28 VITALS
SYSTOLIC BLOOD PRESSURE: 122 MMHG | HEIGHT: 63 IN | RESPIRATION RATE: 16 BRPM | OXYGEN SATURATION: 97 % | HEART RATE: 83 BPM | WEIGHT: 155.2 LBS | BODY MASS INDEX: 27.5 KG/M2 | DIASTOLIC BLOOD PRESSURE: 72 MMHG | TEMPERATURE: 97.3 F

## 2018-09-28 DIAGNOSIS — G47.33 OSA ON CPAP: ICD-10-CM

## 2018-09-28 PROCEDURE — 99213 OFFICE O/P EST LOW 20 MIN: CPT | Performed by: INTERNAL MEDICINE

## 2018-09-28 RX ORDER — AZELASTINE 1 MG/ML
1-2 SPRAY, METERED NASAL 2 TIMES DAILY
Qty: 1 BOTTLE | Refills: 11 | Status: SHIPPED | OUTPATIENT
Start: 2018-09-28 | End: 2021-06-07

## 2018-09-28 NOTE — PROGRESS NOTES
Chief Complaint   Patient presents with   • Follow-Up     6 month follow up     HPI: Vanessa is a 73 y.o. Female who returns for follow-up of severe CLAUDIA. To reiterate, polysomnography in the sleep laboratory showed AHI of 50 events per hour associated with mild hypoxemia, consistent with severe CLAUDIA. She has been compliant with CPAP: 8 cm of water for treatment.  Compliance card confirms CPAP 97% usage for 6 hours nightly. Her average AHI is 10. She is sleeping well and waking rested. She denies daytime hypersomnolence.  She replaces her nasal pillows routinely.  Weight has been stable.  Her principal complaint had been persistent rhinitis.  She was also seen by ENT for hoarseness which was diagnosed as reflux esophagitis.  She has been staying active and busy with her dog, Shahida.         Past Medical History:   Diagnosis Date   • Allergic rhinitis    • Arrhythmia     MVP   • Arthritis     knees/rt thumb   • Breast cancer (HCC)    • Bronchitis    • Bruxism    • Cancer (HCC)     breast   • CATARACT    • Chickenpox    • GERD (gastroesophageal reflux disease)    • Georgian measles    • Glaucoma    • Heart valve disease     MVP   • Hypothyroidism    • Indigestion    • Influenza    • Mumps    • Pain    • Restless leg syndrome    • Snoring    • TMJ (dislocation of temporomandibular joint)    • Tonsillitis    • Unspecified disorder of thyroid    • Unspecified urinary incontinence     sneezing/coughing   • Whooping cough        Social History     Social History   • Marital status:      Spouse name: N/A   • Number of children: N/A   • Years of education: N/A     Occupational History   • Not on file.     Social History Main Topics   • Smoking status: Former Smoker     Packs/day: 1.50     Years: 15.00     Types: Cigarettes     Quit date: 2/13/1983   • Smokeless tobacco: Never Used      Comment: 1.5 pks  a day for a total of 15 yrs   • Alcohol use Yes      Comment: Occasionally   • Drug use: No   • Sexual activity: Not on file  "    Other Topics Concern   • Not on file     Social History Narrative   • No narrative on file       Family History   Problem Relation Age of Onset   • Lung Cancer Father    • Hypertension Brother    • No Known Problems Daughter    • Sleep Apnea Neg Hx        Current Outpatient Prescriptions on File Prior to Visit   Medication Sig Dispense Refill   • JUBLIA 10 % Solution      • levothyroxine (SYNTHROID) 50 MCG Tab      • Fexofenadine HCl (ALLEGRA PO) Take  by mouth.     • anastrozole (ARIMIDEX) 1 MG Tab Take 1 Tab by mouth every day.     • sertraline (ZOLOFT) 100 MG Tab Take 1 Tab by mouth every day.     • phenazopyridine (PYRIDIUM) 200 MG Tab Take 1 Tab by mouth 3 times a day as needed (for painful urination). 6 Tab 0   • zolpidem (AMBIEN) 5 MG Tab Take 1 Tab by mouth at bedtime as needed for Sleep. Take up to 3 tablets at bedtime for sleep study 3 Tab 0     No current facility-administered medications on file prior to visit.        Allergies: Codeine    ROS:   Constitutional: Denies fevers, chills, night sweats, fatigue or weight loss  Eyes: Denies vision loss, pain, drainage, double vision  Ears, Nose, Throat: Denies earache, difficulty hearing, tinnitus, nasal congestion,+ hoarseness,+PND  Cardiovascular: Denies chest pain, tightness, palpitations, orthopnea or edema  Respiratory: Denies shortness of breath, cough, wheezing, hemoptysis  Sleep: Denies daytime sleepiness, snoring, apneas, insomnia, morning headaches  GI: Denies heartburn, dysphagia, nausea, abdominal pain, diarrhea or constipation  : Denies frequent urination, hematuria, discharge or painful urination  Musculoskeletal: Denies back pain, painful joints, sore muscles  Neurological: Denies weakness or headaches  Skin: No rashes    Blood pressure 122/72, pulse 83, temperature 36.3 °C (97.3 °F), temperature source Temporal, resp. rate 16, height 1.6 m (5' 3\"), weight 70.4 kg (155 lb 3.2 oz), SpO2 97 %, not currently breastfeeding.    Physical " Exam:  Appearance: Well-nourished, well-developed, in no acute distress  HEENT: Normocephalic, atraumatic, white sclera, PERRLA, oropharynx clear  Neck: No adenopathy or masses  Respiratory: no intercostal retractions or accessory muscle use  Lungs auscultation: Clear to auscultation bilaterally  Cardiovascular: Regular rate rhythm. No murmurs, rubs or gallops.  No LE edema  Abdomen: soft, nondistended  Gait: Normal  Digits: No clubbing, cyanosis  Motor: No focal deficits  Orientation: Oriented to time, person and place    Diagnosis:  1. CLAUDIA on CPAP  DME CPAP   2.      Rhinitis    Plan:  Vanessa shows excellent compliance on CPAP therapy and is benefiting from treatment.  Her average AHI<10.  Continue CPAP: 8 cm of water.  Replace nasal pillows monthly.  Nasal astelin recommended for rhinitis.  Return in about 6 months (around 3/28/2019).

## 2018-09-28 NOTE — TELEPHONE ENCOUNTER
I called in the Rx Azelastine (Astelin) 137 mcg nasal spray. Sig: spray 1-2 sprays in nose 2 times a day. Qyt 1 bottle refills 11 prescribing physician Dr.Christina Mo. Spoke to the pharmacist named derrick.

## 2019-03-29 ENCOUNTER — APPOINTMENT (OUTPATIENT)
Dept: SLEEP MEDICINE | Facility: MEDICAL CENTER | Age: 74
End: 2019-03-29
Payer: MEDICARE

## 2019-05-09 ENCOUNTER — SLEEP CENTER VISIT (OUTPATIENT)
Dept: SLEEP MEDICINE | Facility: MEDICAL CENTER | Age: 74
End: 2019-05-09
Payer: MEDICARE

## 2019-05-09 VITALS
OXYGEN SATURATION: 96 % | SYSTOLIC BLOOD PRESSURE: 110 MMHG | HEART RATE: 81 BPM | DIASTOLIC BLOOD PRESSURE: 70 MMHG | WEIGHT: 155 LBS | BODY MASS INDEX: 27.46 KG/M2 | HEIGHT: 63 IN | RESPIRATION RATE: 16 BRPM

## 2019-05-09 DIAGNOSIS — G47.33 OSA (OBSTRUCTIVE SLEEP APNEA): ICD-10-CM

## 2019-05-09 PROCEDURE — 99214 OFFICE O/P EST MOD 30 MIN: CPT | Performed by: NURSE PRACTITIONER

## 2019-05-09 NOTE — PROGRESS NOTES
CC:  Here for f/u sleep issues as listed below    HPI:   Roseline presents today for follow up obstructive sleep apnea. PMH of breast CA, RLS, arrythmia.     Since last OV she had a total knee replacement in Dec 2018 and had trouble using the machine at that time.     PSG from 2017 indicated an AHI of 50.4 and low oxygenation of 83%.  Currently she is being treated with CPAP @ 8cmH20.  Compliance download is not working today.  She admits she is using it daily for appx 7-8 hours.  She does tolerate pressure and mask well.  She wakes up refreshed and is less tired throughout the day. They deny morning H/A. She sleeps better overall. She will continue to clean supplies weekly and change them as insurance allows.  Having trouble obtaining new supplies.       Patient Active Problem List    Diagnosis Date Noted   • CLAUDIA (obstructive sleep apnea) 05/09/2019   • Malignant neoplasm of other specified sites of female breast 10/19/2012       Past Medical History:   Diagnosis Date   • Allergic rhinitis    • Arrhythmia     MVP   • Arthritis     knees/rt thumb   • Breast cancer (HCC)    • Bronchitis    • Bruxism    • Cancer (HCC)     breast   • CATARACT    • Chickenpox    • GERD (gastroesophageal reflux disease)    • [place] measles    • Glaucoma    • Heart valve disease     MVP   • Hypothyroidism    • Indigestion    • Influenza    • Mumps    • Pain    • Restless leg syndrome    • Snoring    • TMJ (dislocation of temporomandibular joint)    • Tonsillitis    • Unspecified disorder of thyroid    • Unspecified urinary incontinence     sneezing/coughing   • Whooping cough        Past Surgical History:   Procedure Laterality Date   • STEREOTACTIC BIOPSY Left 01/26/2017   • LUMPECTOMY  10/19/2012    Performed by Efrain Eagle M.D. at SURGERY [facility]   • NODE BIOPSY SENTINEL  10/19/2012    Performed by Efrain Eagle M.D. at SURGERY [facility]   • FINGER ARTHROPLASTY  1/15/2010    Performed by EFRAIN WILKINS at  SURGERY SAME DAY [facility] ORS   • TENDON TRANSFER  1/15/2010    Performed by EFRAIN WILKINS at SURGERY SAME DAY [facility] ORS   • OTHER  2006    face lift   • OTHER  2003    sinus    • ARTHROSCOPY, KNEE     • OTHER ORTHOPEDIC SURGERY      rt and lft meniscus   • OTHER ORTHOPEDIC SURGERY      lft toes   • PB REMV 2ND CATARACT,CORN-SCLER SECTN     • SINUSCOPE     • TONSILLECTOMY     • US-NEEDLE CORE BX-BREAST PANEL         Family History   Problem Relation Age of Onset   • Lung Cancer Father    • Hypertension Brother    • No Known Problems Daughter    • Sleep Apnea Neg Hx        Social History     Social History   • Marital status:      Spouse name: N/A   • Number of children: N/A   • Years of education: N/A     Occupational History   • Not on file.     Social History Main Topics   • Smoking status: Former Smoker     Packs/day: 1.50     Years: 15.00     Types: Cigarettes     Quit date: 2/13/1983   • Smokeless tobacco: Never Used      Comment: 1.5 pks  a day for a total of 15 yrs   • Alcohol use Yes      Comment: Occasionally   • Drug use: No   • Sexual activity: Not on file     Other Topics Concern   • Not on file     Social History Narrative   • No narrative on file       Current Outpatient Prescriptions   Medication Sig Dispense Refill   • azelastine (ASTELIN) 137 MCG/SPRAY nasal spray Spray 1-2 Sprays in nose 2 times a day. 1 Bottle 11   • JUBLIA 10 % Solution      • levothyroxine (SYNTHROID) 50 MCG Tab      • Fexofenadine HCl (ALLEGRA PO) Take  by mouth.     • anastrozole (ARIMIDEX) 1 MG Tab Take 1 Tab by mouth every day.     • sertraline (ZOLOFT) 100 MG Tab Take 1 Tab by mouth every day.       No current facility-administered medications for this visit.           Allergies: Codeine      ROS   Gen: Denies fever, chills, unintentional weight loss, fatigue  Resp:Denies Dyspnea  CV: Denies chest pain, chest tightness  Sleep:Denies morning headache, insomnia, daytime somnolence, snoring, gasping for air,  "apnea  Neuro: Denies frequent headaches, weakness, dizziness  See HPI.  All other systems reviewed and negative        Vital signs for this encounter:  Vitals:    05/09/19 1428   Height: 1.6 m (5' 3\")   Weight: 70.3 kg (155 lb)   Weight % change since last entry.: 0 %   BP: 110/70   Pulse: 81   BMI (Calculated): 27.46   Resp: 16                   Physical Exam:   Gen:         Alert and oriented, No apparent distress.   Neck:        No Lymphadenopathy.  Lungs:     Clear to auscultation bilaterally.    CV:          Regular rate and rhythm. No murmurs, rubs or gallops.   Abd:         Soft non tender, non distended.            Ext:          No clubbing, cyanosis, edema.    Assessment   1. CLAUDIA (obstructive sleep apnea)  DME Mask and Supplies    DME Other    DME Other       Patient is clinically stable and will proceed with following plan.     PLAN:   Patient Instructions   1) Continue CPAP at 8cmH20  2) Clean mask and supplies weekly and change them as insurance allows  3) Light conditioning encouraged  4) Continue smoking cessation   5) Vaccines: Up to date with influenza vaccine  6) Return in about 6 months (around 11/9/2019) for follow up with ELIZABETH Horner, if not sooner, review of symptoms, Compliance.        "

## 2019-05-09 NOTE — PATIENT INSTRUCTIONS
1) Continue CPAP at 8cmH20  2) Clean mask and supplies weekly and change them as insurance allows  3) Light conditioning encouraged  4) Continue smoking cessation   5) Vaccines: Up to date with influenza vaccine  6) Return in about 6 months (around 11/9/2019) for follow up with ELIZABETH Horner, if not sooner, review of symptoms, Compliance.

## 2019-07-10 ENCOUNTER — HOSPITAL ENCOUNTER (OUTPATIENT)
Dept: RADIOLOGY | Facility: MEDICAL CENTER | Age: 74
End: 2019-07-10
Attending: SURGERY
Payer: MEDICARE

## 2019-07-10 DIAGNOSIS — R92.8 ABNORMAL FINDINGS ON DIAGNOSTIC IMAGING OF BREAST: ICD-10-CM

## 2019-07-10 PROCEDURE — G0279 TOMOSYNTHESIS, MAMMO: HCPCS

## 2019-07-15 ENCOUNTER — TELEPHONE (OUTPATIENT)
Dept: RADIOLOGY | Facility: MEDICAL CENTER | Age: 74
End: 2019-07-15

## 2019-07-16 ENCOUNTER — TELEPHONE (OUTPATIENT)
Dept: PULMONOLOGY | Facility: HOSPICE | Age: 74
End: 2019-07-16

## 2019-07-16 ENCOUNTER — APPOINTMENT (OUTPATIENT)
Dept: RADIOLOGY | Facility: MEDICAL CENTER | Age: 74
End: 2019-07-16
Attending: INTERNAL MEDICINE
Payer: MEDICARE

## 2019-07-16 RX ORDER — IPRATROPIUM BROMIDE 21 UG/1
1-2 SPRAY, METERED NASAL 3 TIMES DAILY PRN
Qty: 1 BOTTLE | Refills: 6 | Status: SHIPPED | OUTPATIENT
Start: 2019-07-16 | End: 2021-06-07

## 2019-07-17 ENCOUNTER — HOSPITAL ENCOUNTER (OUTPATIENT)
Dept: RADIOLOGY | Facility: MEDICAL CENTER | Age: 74
End: 2019-07-17
Attending: INTERNAL MEDICINE
Payer: MEDICARE

## 2019-07-17 DIAGNOSIS — C50.912 MALIGNANT NEOPLASM OF LEFT FEMALE BREAST, UNSPECIFIED ESTROGEN RECEPTOR STATUS, UNSPECIFIED SITE OF BREAST (HCC): ICD-10-CM

## 2019-07-17 PROCEDURE — 77080 DXA BONE DENSITY AXIAL: CPT

## 2019-11-04 ENCOUNTER — HOSPITAL ENCOUNTER (OUTPATIENT)
Dept: RADIOLOGY | Facility: MEDICAL CENTER | Age: 74
End: 2019-11-04
Attending: INTERNAL MEDICINE
Payer: MEDICARE

## 2019-11-04 DIAGNOSIS — C50.912 MALIGNANT NEOPLASM OF LEFT FEMALE BREAST, UNSPECIFIED ESTROGEN RECEPTOR STATUS, UNSPECIFIED SITE OF BREAST (HCC): ICD-10-CM

## 2019-11-04 PROCEDURE — 76700 US EXAM ABDOM COMPLETE: CPT

## 2020-02-20 ENCOUNTER — APPOINTMENT (OUTPATIENT)
Dept: SLEEP MEDICINE | Facility: MEDICAL CENTER | Age: 75
End: 2020-02-20
Payer: MEDICARE

## 2020-08-24 ENCOUNTER — HOSPITAL ENCOUNTER (OUTPATIENT)
Dept: RADIOLOGY | Facility: MEDICAL CENTER | Age: 75
End: 2020-08-24
Attending: INTERNAL MEDICINE
Payer: MEDICARE

## 2020-08-24 DIAGNOSIS — Z12.31 VISIT FOR SCREENING MAMMOGRAM: ICD-10-CM

## 2020-08-24 PROCEDURE — 77067 SCR MAMMO BI INCL CAD: CPT

## 2020-09-18 ENCOUNTER — TELEMEDICINE (OUTPATIENT)
Dept: SLEEP MEDICINE | Facility: MEDICAL CENTER | Age: 75
End: 2020-09-18
Payer: MEDICARE

## 2020-09-18 VITALS — WEIGHT: 143 LBS | HEIGHT: 63 IN | BODY MASS INDEX: 25.34 KG/M2

## 2020-09-18 DIAGNOSIS — G47.33 OSA (OBSTRUCTIVE SLEEP APNEA): ICD-10-CM

## 2020-09-18 PROCEDURE — 99213 OFFICE O/P EST LOW 20 MIN: CPT | Mod: 95,CR | Performed by: INTERNAL MEDICINE

## 2020-09-18 NOTE — PROGRESS NOTES
Telemedicine: Established Patient   This evaluation was conducted via Platform [employer] using secure and encrypted videoconferencing technology.  Patient's identity was verified.    Subjective:   CC:   Chief Complaint   Patient presents with   • Apnea     Last Seen 05/09/19       Roseline Redding is a 75 y.o. female presenting for evaluation and management of CLAUDIA. To reiterate, polysomnography in the sleep laboratory showed AHI of 50 events per hour associated with mild hypoxemia, consistent with severe CLAUDIA. She has been compliant with CPAP: 8 cm of water for treatment.  Compliance card could not be downloaded today as she is not wireless.  She is sleeping well and waking rested. Attaining 7-8 hrs sleep/night. She denies daytime hypersomnolence.  She replaces her nasal pillows routinely.  Weight is down 15 pounds on a vegan diet.   She and her , Jack and dog, Shahida, have been isolating throughout the pandemic however staying active.    Mojica in HPI, otherwise negative      Allergies   Allergen Reactions   • Codeine      dizziness       Current medicines (including changes today)  Current Outpatient Medications   Medication Sig Dispense Refill   • levothyroxine (SYNTHROID) 50 MCG Tab      • Fexofenadine HCl (ALLEGRA PO) Take  by mouth.     • anastrozole (ARIMIDEX) 1 MG Tab Take 1 Tab by mouth every day.     • sertraline (ZOLOFT) 100 MG Tab Take 1 Tab by mouth every day.     • ipratropium (ATROVENT) 0.03 % Solution Spray 1-2 Sprays in nose 3 times a day as needed. Each nostril. (Patient not taking: Reported on 9/18/2020) 1 Bottle 6   • azelastine (ASTELIN) 137 MCG/SPRAY nasal spray Spray 1-2 Sprays in nose 2 times a day. (Patient not taking: Reported on 9/18/2020) 1 Bottle 11   • JUBLIA 10 % Solution        No current facility-administered medications for this visit.        Patient Active Problem List    Diagnosis Date Noted   • CLAUDIA (obstructive sleep apnea) 05/09/2019   • Malignant neoplasm of other specified  "sites of female breast 10/19/2012       Family History   Problem Relation Age of Onset   • Lung Cancer Father    • Hypertension Brother    • No Known Problems Daughter    • Sleep Apnea Neg Hx        She  has a past medical history of Allergic rhinitis, Arrhythmia, Arthritis, Breast cancer (HCC), Bronchitis, Bruxism, Cancer (HCC), CATARACT, Chickenpox, GERD (gastroesophageal reflux disease), Spanish measles, Glaucoma, Heart valve disease, Hypothyroidism, Indigestion, Influenza, Mumps, Pain, Restless leg syndrome, Snoring, TMJ (dislocation of temporomandibular joint), Tonsillitis, Unspecified disorder of thyroid, Unspecified urinary incontinence, and Whooping cough.  She  has a past surgical history that includes other (2003); other (2006); other orthopedic surgery; finger arthroplasty (1/15/2010); tendon transfer (1/15/2010); other orthopedic surgery; lumpectomy (10/19/2012); node biopsy sentinel (10/19/2012); stereotactic biopsy (Left, 01/26/2017); pr remv 2nd cataract,corn-scler sectn; tonsillectomy; arthroscopy, knee; sinuscope; and us-needle core bx-breast panel.       Objective:   Ht 1.6 m (5' 3\")   Wt 64.9 kg (143 lb)   BMI 25.33 kg/m²     Physical Exam    Assessment and Plan:   The following treatment plan was discussed:     1. CLAUDIA (obstructive sleep apnea)    Vanessa is compliant with CPAP:8 cm H20 with excellent clinical response to treatment and is benefiting from therapy.  We will download compliance card when available.    Continue CPAP at current pressure settings and replace nasal pillows q4 weeks.  Follow-up: Return in about 6 months (around 3/18/2021).         "

## 2021-01-12 DIAGNOSIS — Z23 NEED FOR VACCINATION: ICD-10-CM

## 2021-01-22 PROCEDURE — 91300 PFIZER SARS-COV-2 VACCINE: CPT

## 2021-01-22 PROCEDURE — 0001A PFIZER SARS-COV-2 VACCINE: CPT

## 2021-01-23 ENCOUNTER — IMMUNIZATION (OUTPATIENT)
Dept: FAMILY PLANNING/WOMEN'S HEALTH CLINIC | Facility: IMMUNIZATION CENTER | Age: 76
End: 2021-01-23
Attending: INTERNAL MEDICINE
Payer: MEDICARE

## 2021-01-23 DIAGNOSIS — Z23 NEED FOR VACCINATION: ICD-10-CM

## 2021-01-23 DIAGNOSIS — Z23 ENCOUNTER FOR VACCINATION: Primary | ICD-10-CM

## 2021-02-13 ENCOUNTER — IMMUNIZATION (OUTPATIENT)
Dept: FAMILY PLANNING/WOMEN'S HEALTH CLINIC | Facility: IMMUNIZATION CENTER | Age: 76
End: 2021-02-13
Attending: INTERNAL MEDICINE
Payer: MEDICARE

## 2021-02-13 DIAGNOSIS — Z23 ENCOUNTER FOR VACCINATION: Primary | ICD-10-CM

## 2021-02-13 PROCEDURE — 0002A PFIZER SARS-COV-2 VACCINE: CPT

## 2021-02-13 PROCEDURE — 91300 PFIZER SARS-COV-2 VACCINE: CPT

## 2021-05-03 ENCOUNTER — OFFICE VISIT (OUTPATIENT)
Dept: SLEEP MEDICINE | Facility: MEDICAL CENTER | Age: 76
End: 2021-05-03
Payer: MEDICARE

## 2021-05-03 VITALS
SYSTOLIC BLOOD PRESSURE: 110 MMHG | RESPIRATION RATE: 20 BRPM | BODY MASS INDEX: 24.68 KG/M2 | HEART RATE: 60 BPM | OXYGEN SATURATION: 98 % | DIASTOLIC BLOOD PRESSURE: 56 MMHG | WEIGHT: 139.3 LBS

## 2021-05-03 DIAGNOSIS — G47.33 OSA (OBSTRUCTIVE SLEEP APNEA): ICD-10-CM

## 2021-05-03 PROCEDURE — 99213 OFFICE O/P EST LOW 20 MIN: CPT | Performed by: INTERNAL MEDICINE

## 2021-05-03 RX ORDER — LATANOPROST 50 UG/ML
1 SOLUTION/ DROPS OPHTHALMIC DAILY
COMMUNITY

## 2021-05-03 NOTE — PROGRESS NOTES
Chief Complaint   Patient presents with   • Follow-Up     6m FV, last seen 20 by Dr Mo for CLAUDIA       Roseline Redding is a 76 y.o. female presenting for evaluation and management of CLAUDIA. To reiterate, polysomnography in the sleep laboratory showed AHI of 50 events per hour associated with mild hypoxemia, consistent with severe CLAUDIA. She has been compliant with CPAP: 8 cm of water for treatment.  Forgot her compliance chip today.  She is sleeping well and waking rested. Attaining 7-8 hrs sleep/night. She denies daytime hypersomnolence.  She replaces her nasal pillows routinely. Weight is down 26 pounds on a vegan diet.   Viraj and dog, Shahida, have welcomed a new goldendoodle to the family.    Past Medical History:   Diagnosis Date   • Allergic rhinitis    • Arrhythmia     MVP   • Arthritis     knees/rt thumb   • Breast cancer (HCC)    • Bronchitis    • Bruxism    • Cancer (HCC)     breast   • CATARACT    • Chickenpox    • GERD (gastroesophageal reflux disease)    • Stateless measles    • Glaucoma    • Heart valve disease     MVP   • Hypothyroidism    • Indigestion    • Influenza    • Mumps    • Pain    • Restless leg syndrome    • Snoring    • TMJ (dislocation of temporomandibular joint)    • Tonsillitis    • Unspecified disorder of thyroid    • Unspecified urinary incontinence     sneezing/coughing   • Whooping cough        Social History     Socioeconomic History   • Marital status:      Spouse name: Not on file   • Number of children: Not on file   • Years of education: Not on file   • Highest education level: Not on file   Occupational History   • Not on file   Tobacco Use   • Smoking status: Former Smoker     Packs/day: 1.50     Years: 15.00     Pack years: 22.50     Types: Cigarettes     Quit date: 1983     Years since quittin.2   • Smokeless tobacco: Never Used   • Tobacco comment: 1.5 pks  a day for a total of 15 yrs   Substance and Sexual Activity   • Alcohol use: Yes     Comment:  Occasionally   • Drug use: No   • Sexual activity: Not on file   Other Topics Concern   • Not on file   Social History Narrative   • Not on file     Social Determinants of Health     Financial Resource Strain:    • Difficulty of Paying Living Expenses:    Food Insecurity:    • Worried About Running Out of Food in the Last Year:    • Ran Out of Food in the Last Year:    Transportation Needs:    • Lack of Transportation (Medical):    • Lack of Transportation (Non-Medical):    Physical Activity:    • Days of Exercise per Week:    • Minutes of Exercise per Session:    Stress:    • Feeling of Stress :    Social Connections:    • Frequency of Communication with Friends and Family:    • Frequency of Social Gatherings with Friends and Family:    • Attends Sikhism Services:    • Active Member of Clubs or Organizations:    • Attends Club or Organization Meetings:    • Marital Status:    Intimate Partner Violence:    • Fear of Current or Ex-Partner:    • Emotionally Abused:    • Physically Abused:    • Sexually Abused:        Family History   Problem Relation Age of Onset   • Lung Cancer Father    • Hypertension Brother    • No Known Problems Daughter    • Sleep Apnea Neg Hx        Current Outpatient Medications on File Prior to Visit   Medication Sig Dispense Refill   • Homeopathic Products (ZICAM ALLERGY RELIEF NA) Administer  into affected nostril(S).     • latanoprost (XALATAN) 0.005 % Solution latanoprost 0.005 % eye drops     • levothyroxine (SYNTHROID) 50 MCG Tab      • anastrozole (ARIMIDEX) 1 MG Tab Take 1 Tab by mouth every day.     • sertraline (ZOLOFT) 100 MG Tab Take 1 Tab by mouth every day.     • ipratropium (ATROVENT) 0.03 % Solution Spray 1-2 Sprays in nose 3 times a day as needed. Each nostril. (Patient not taking: Reported on 9/18/2020) 1 Bottle 6   • azelastine (ASTELIN) 137 MCG/SPRAY nasal spray Spray 1-2 Sprays in nose 2 times a day. (Patient not taking: Reported on 9/18/2020) 1 Bottle 11   • JUBLIA 10  % Solution      • Fexofenadine HCl (ALLEGRA PO) Take  by mouth.       No current facility-administered medications on file prior to visit.       Allergies: Codeine    ROS:   Constitutional: Denies fevers, chills, night sweats, fatigue or weight loss  Eyes: Denies vision loss, pain, drainage, double vision  Ears, Nose, Throat: Denies earache, difficulty hearing, tinnitus, nasal congestion, hoarseness  Cardiovascular: Denies chest pain, tightness, palpitations, orthopnea or edema  Respiratory: Denies shortness of breath, cough, wheezing, hemoptysis  Sleep: Denies daytime sleepiness, snoring, apneas, insomnia, morning headaches  GI: Denies heartburn, dysphagia, nausea, abdominal pain, diarrhea or constipation  : Denies frequent urination, hematuria, discharge or painful urination  Musculoskeletal: Denies back pain, painful joints, sore muscles  Neurological: Denies weakness or headaches  Skin: No rashes    /56   Pulse 60   Resp 20   Wt 63.2 kg (139 lb 4.8 oz)   SpO2 98%     Physical Exam:  Appearance: Well-nourished, well-developed, in no acute distress  HEENT: Normocephalic, atraumatic, white sclera, PERRLA, masked  Neck: No adenopathy or masses  Respiratory: no intercostal retractions or accessory muscle use  Lungs auscultation: Clear to auscultation bilaterally  Cardiovascular: Regular rate rhythm. No murmurs, rubs or gallops.  No LE edema  Abdomen: soft, nondistended  Gait: Normal  Digits: No clubbing, cyanosis  Motor: No focal deficits  Orientation: Oriented to time, person and place    Diagnosis:  1. CLAUDIA (obstructive sleep apnea)         Plan:  Vanessa endorses compliance with CPAP and shows excellent clinical response to treatment.  She will bring in her CPAP compliance card for download.    Maintain CPAP on 8 cm of water.    Replace nasal pillows every 4 weeks.  Return in about 6 months (around 11/3/2021).

## 2021-06-04 ENCOUNTER — HOSPITAL ENCOUNTER (OUTPATIENT)
Dept: LAB | Facility: MEDICAL CENTER | Age: 76
End: 2021-06-04
Attending: ANESTHESIOLOGY
Payer: MEDICARE

## 2021-06-04 LAB
COVID ORDER STATUS COVID19: NORMAL
SARS-COV-2 RNA RESP QL NAA+PROBE: NOTDETECTED
SPECIMEN SOURCE: NORMAL

## 2021-06-04 PROCEDURE — U0005 INFEC AGEN DETEC AMPLI PROBE: HCPCS

## 2021-06-04 PROCEDURE — U0003 INFECTIOUS AGENT DETECTION BY NUCLEIC ACID (DNA OR RNA); SEVERE ACUTE RESPIRATORY SYNDROME CORONAVIRUS 2 (SARS-COV-2) (CORONAVIRUS DISEASE [COVID-19]), AMPLIFIED PROBE TECHNIQUE, MAKING USE OF HIGH THROUGHPUT TECHNOLOGIES AS DESCRIBED BY CMS-2020-01-R: HCPCS

## 2021-06-04 PROCEDURE — C9803 HOPD COVID-19 SPEC COLLECT: HCPCS

## 2021-07-28 ENCOUNTER — PRE-ADMISSION TESTING (OUTPATIENT)
Dept: ADMISSIONS | Facility: MEDICAL CENTER | Age: 76
End: 2021-07-28
Attending: INTERNAL MEDICINE
Payer: MEDICARE

## 2021-07-28 DIAGNOSIS — Z01.812 PRE-OPERATIVE LABORATORY EXAMINATION: ICD-10-CM

## 2021-07-28 LAB
ANION GAP SERPL CALC-SCNC: 10 MMOL/L (ref 7–16)
BUN SERPL-MCNC: 26 MG/DL (ref 8–22)
CALCIUM SERPL-MCNC: 9.3 MG/DL (ref 8.4–10.2)
CHLORIDE SERPL-SCNC: 101 MMOL/L (ref 96–112)
CO2 SERPL-SCNC: 27 MMOL/L (ref 20–33)
CREAT SERPL-MCNC: 0.6 MG/DL (ref 0.5–1.4)
ERYTHROCYTE [DISTWIDTH] IN BLOOD BY AUTOMATED COUNT: 43.8 FL (ref 35.9–50)
GLUCOSE SERPL-MCNC: 142 MG/DL (ref 65–99)
HCT VFR BLD AUTO: 40.1 % (ref 37–47)
HGB BLD-MCNC: 12.7 G/DL (ref 12–16)
MCH RBC QN AUTO: 25.6 PG (ref 27–33)
MCHC RBC AUTO-ENTMCNC: 31.7 G/DL (ref 33.6–35)
MCV RBC AUTO: 80.7 FL (ref 81.4–97.8)
PLATELET # BLD AUTO: 138 K/UL (ref 164–446)
PMV BLD AUTO: 9.9 FL (ref 9–12.9)
POTASSIUM SERPL-SCNC: 3.7 MMOL/L (ref 3.6–5.5)
RBC # BLD AUTO: 4.97 M/UL (ref 4.2–5.4)
SODIUM SERPL-SCNC: 138 MMOL/L (ref 135–145)
WBC # BLD AUTO: 5.4 K/UL (ref 4.8–10.8)

## 2021-07-28 PROCEDURE — 80048 BASIC METABOLIC PNL TOTAL CA: CPT

## 2021-07-28 PROCEDURE — 85027 COMPLETE CBC AUTOMATED: CPT

## 2021-07-28 PROCEDURE — 36415 COLL VENOUS BLD VENIPUNCTURE: CPT

## 2021-07-28 RX ORDER — VALACYCLOVIR HYDROCHLORIDE 500 MG/1
TABLET, FILM COATED ORAL
COMMUNITY
Start: 2021-07-22 | End: 2021-07-28

## 2021-07-28 NOTE — OR NURSING
"Preadmit appointment: \" Preparing for your Procedure information\" sheet given to patient with verbal and written instructions. Patient instructed to continue prescribed medications through the day before surgery, instructed to take the following medications the day of surgery per anesthesia protocol: SYNTHROID, ZOLOFT           Verbal and written instructions on wear a mask in public and with persons known to not have had the Covid-19 vaccine  prior to surgery and covid symptoms to watch for given to patient, pt advised to notify MD if any symptoms develop.    CBC, BMP ordered and collected 07/28/21    STOP/BANG protocol initiated. Pt states she uses CPAP but her machine was recalled and she has not received a replacement one.    Fall Risk = 9 ordered and protocol initiated    "

## 2021-08-18 ENCOUNTER — ANESTHESIA (OUTPATIENT)
Dept: SURGERY | Facility: MEDICAL CENTER | Age: 76
End: 2021-08-18
Payer: MEDICARE

## 2021-08-18 ENCOUNTER — ANESTHESIA EVENT (OUTPATIENT)
Dept: SURGERY | Facility: MEDICAL CENTER | Age: 76
End: 2021-08-18
Payer: MEDICARE

## 2021-08-18 ENCOUNTER — HOSPITAL ENCOUNTER (OUTPATIENT)
Facility: MEDICAL CENTER | Age: 76
End: 2021-08-18
Attending: INTERNAL MEDICINE | Admitting: INTERNAL MEDICINE
Payer: MEDICARE

## 2021-08-18 VITALS
DIASTOLIC BLOOD PRESSURE: 78 MMHG | HEIGHT: 63 IN | BODY MASS INDEX: 23.55 KG/M2 | OXYGEN SATURATION: 96 % | RESPIRATION RATE: 16 BRPM | SYSTOLIC BLOOD PRESSURE: 160 MMHG | HEART RATE: 67 BPM | WEIGHT: 132.94 LBS | TEMPERATURE: 97.3 F

## 2021-08-18 LAB
EKG IMPRESSION: NORMAL
PATHOLOGY CONSULT NOTE: NORMAL

## 2021-08-18 PROCEDURE — 160036 HCHG PACU - EA ADDL 30 MINS PHASE I: Performed by: INTERNAL MEDICINE

## 2021-08-18 PROCEDURE — 160046 HCHG PACU - 1ST 60 MINS PHASE II: Performed by: INTERNAL MEDICINE

## 2021-08-18 PROCEDURE — 93010 ELECTROCARDIOGRAM REPORT: CPT | Performed by: INTERNAL MEDICINE

## 2021-08-18 PROCEDURE — 93005 ELECTROCARDIOGRAM TRACING: CPT | Performed by: INTERNAL MEDICINE

## 2021-08-18 PROCEDURE — 88305 TISSUE EXAM BY PATHOLOGIST: CPT

## 2021-08-18 PROCEDURE — 700111 HCHG RX REV CODE 636 W/ 250 OVERRIDE (IP): Performed by: ANESTHESIOLOGY

## 2021-08-18 PROCEDURE — 160035 HCHG PACU - 1ST 60 MINS PHASE I: Performed by: INTERNAL MEDICINE

## 2021-08-18 PROCEDURE — 160025 RECOVERY II MINUTES (STATS): Performed by: INTERNAL MEDICINE

## 2021-08-18 PROCEDURE — 160203 HCHG ENDO MINUTES - 1ST 30 MINS LEVEL 4: Performed by: INTERNAL MEDICINE

## 2021-08-18 PROCEDURE — 160002 HCHG RECOVERY MINUTES (STAT): Performed by: INTERNAL MEDICINE

## 2021-08-18 PROCEDURE — 160009 HCHG ANES TIME/MIN: Performed by: INTERNAL MEDICINE

## 2021-08-18 PROCEDURE — 700105 HCHG RX REV CODE 258: Performed by: INTERNAL MEDICINE

## 2021-08-18 PROCEDURE — 160048 HCHG OR STATISTICAL LEVEL 1-5: Performed by: INTERNAL MEDICINE

## 2021-08-18 RX ORDER — DIPHENHYDRAMINE HYDROCHLORIDE 50 MG/ML
12.5 INJECTION INTRAMUSCULAR; INTRAVENOUS
Status: DISCONTINUED | OUTPATIENT
Start: 2021-08-18 | End: 2021-08-18 | Stop reason: HOSPADM

## 2021-08-18 RX ORDER — ONDANSETRON 2 MG/ML
4 INJECTION INTRAMUSCULAR; INTRAVENOUS
Status: DISCONTINUED | OUTPATIENT
Start: 2021-08-18 | End: 2021-08-18 | Stop reason: HOSPADM

## 2021-08-18 RX ORDER — SODIUM CHLORIDE, SODIUM LACTATE, POTASSIUM CHLORIDE, CALCIUM CHLORIDE 600; 310; 30; 20 MG/100ML; MG/100ML; MG/100ML; MG/100ML
INJECTION, SOLUTION INTRAVENOUS CONTINUOUS
Status: DISCONTINUED | OUTPATIENT
Start: 2021-08-18 | End: 2021-08-18 | Stop reason: HOSPADM

## 2021-08-18 RX ORDER — CEFAZOLIN SODIUM 1 G/3ML
INJECTION, POWDER, FOR SOLUTION INTRAMUSCULAR; INTRAVENOUS PRN
Status: DISCONTINUED | OUTPATIENT
Start: 2021-08-18 | End: 2021-08-18 | Stop reason: SURG

## 2021-08-18 RX ORDER — MIDAZOLAM HYDROCHLORIDE 1 MG/ML
INJECTION INTRAMUSCULAR; INTRAVENOUS PRN
Status: DISCONTINUED | OUTPATIENT
Start: 2021-08-18 | End: 2021-08-18 | Stop reason: SURG

## 2021-08-18 RX ORDER — HALOPERIDOL 5 MG/ML
1 INJECTION INTRAMUSCULAR
Status: DISCONTINUED | OUTPATIENT
Start: 2021-08-18 | End: 2021-08-18 | Stop reason: HOSPADM

## 2021-08-18 RX ADMIN — PROPOFOL 40 MG: 10 INJECTION, EMULSION INTRAVENOUS at 09:23

## 2021-08-18 RX ADMIN — SODIUM CHLORIDE, POTASSIUM CHLORIDE, SODIUM LACTATE AND CALCIUM CHLORIDE: 600; 310; 30; 20 INJECTION, SOLUTION INTRAVENOUS at 08:52

## 2021-08-18 RX ADMIN — PROPOFOL 40 MG: 10 INJECTION, EMULSION INTRAVENOUS at 09:13

## 2021-08-18 RX ADMIN — MIDAZOLAM HYDROCHLORIDE 3 MG: 1 INJECTION, SOLUTION INTRAMUSCULAR; INTRAVENOUS at 09:06

## 2021-08-18 RX ADMIN — MIDAZOLAM HYDROCHLORIDE 2 MG: 1 INJECTION, SOLUTION INTRAMUSCULAR; INTRAVENOUS at 09:09

## 2021-08-18 RX ADMIN — CEFAZOLIN 1 G: 330 INJECTION, POWDER, FOR SOLUTION INTRAMUSCULAR; INTRAVENOUS at 09:05

## 2021-08-18 ASSESSMENT — ENCOUNTER SYMPTOMS
POLYDIPSIA: 0
EYE REDNESS: 0
NERVOUS/ANXIOUS: 0
DIARRHEA: 1
SEIZURES: 0
WEIGHT LOSS: 1
STRIDOR: 0
BRUISES/BLEEDS EASILY: 1
SHORTNESS OF BREATH: 0
PHOTOPHOBIA: 0
BLOOD IN STOOL: 0
COUGH: 0
FLANK PAIN: 0
SINUS PAIN: 0
FALLS: 0

## 2021-08-18 ASSESSMENT — LIFESTYLE VARIABLES: SUBSTANCE_ABUSE: 0

## 2021-08-18 ASSESSMENT — PAIN SCALES - GENERAL: PAIN_LEVEL: 0

## 2021-08-18 NOTE — OR NURSING
0809: Brought patient back to pre-op and assumed care.  0850: Patient allergies and NPO status verified, home medication reconciliation completed and belongings secured. Patient verbalizes understanding of pain scale, expected course of stay and plan of care. Surgical site verified with patient. IV access established.

## 2021-08-18 NOTE — DISCHARGE INSTRUCTIONS
ENDOSCOPY HOME CARE INSTRUCTIONS    COLONOSCOPY OR FLEXIBLE SIGMOIDOSCOPY  1. If you received a barium enema, take a mild laxative such as dulcolax, Maryjo's M.O., or Milk of Magnesia to clean out the barium.  2. Drink plenty of fluids. Eat a diet high in fiber (whole-grain breads, fresh fruit and vegetables).  3. You may notice a few drops of blood with your first bowel movement. If you develop any large amount of bleeding, black stools, a fever, or abdominal pain, call your doctor right away.  4. Call your doctor for test results in 2 weeks   5. Don't drive or drink alcohol for 24 hours. The medication you received will make you too drowsy.  6. No heavy lifting, no Aspirin products or Aspirin for 5 days       [facility] office will call for follow-up with Dr. Orta to consider biofeedback.      Results to convey to patient: colonic diverticulosis, small non-bleeding hemorrhoids, no evidence of anal sphincter complete tear, likely pelvic floor dysfunction.    Dr. Todd [phone]    You should call 911 if you develop problems with breathing or chest pain.  If any questions arise, call your doctor. If your doctor is not available, please feel free to call [phone]. You can also call the HEALTH HOTLINE open 24 hours/day, 7 days/week and speak to a nurse at [phone], or toll free [phone].    Depression / Suicide Risk    As you are discharged from this [facility] facility, it is important to learn how to keep safe from harming yourself.    Recognize the warning signs:  · Abrupt changes in personality, positive or negative- including increase in energy   · Giving away possessions  · Change in eating patterns- significant weight changes-  positive or negative  · Change in sleeping patterns- unable to sleep or sleeping all the time   · Unwillingness or inability to communicate  · Depression  · Unusual sadness, discouragement and loneliness  · Talk of wanting to die  · Neglect of personal  appearance   · Rebelliousness- reckless behavior  · Withdrawal from people/activities they love  · Confusion- inability to concentrate     If you or a loved one observes any of these behaviors or has concerns about self-harm, here's what you can do:  · Talk about it- your feelings and reasons for harming yourself  · Remove any means that you might use to hurt yourself (examples: pills, rope, extension cords, firearm)  · Get professional help from the community (Mental Health, Substance Abuse, psychological counseling)  · Do not be alone:Call your Safe Contact- someone whom you trust who will be there for you.  · Call your local CRISIS HOTLINE 618-1711 or 159-682-4919  · Call your local Children's Mobile Crisis Response Team [place] (580) 427-0889 or www.Red e App  · Call the toll free National Suicide Prevention Hotlines   · National Suicide Prevention Lifeline 831-056-MICX (7408)  · National Hope Line Network 800-SUICIDE (023-2552)    I acknowledge receipt and understanding of these Home Care Instructions.

## 2021-08-18 NOTE — OR NURSING
1002-patient awake, alert, denies any pain or nausea. Keeping pt in PACU for 1 hour for STOP bang    1015-called and updated patients daughter at this time     1021-report back to Rishi VENTURA.

## 2021-08-18 NOTE — ANESTHESIA POSTPROCEDURE EVALUATION
Patient: Roseline Redding    Procedure Summary     Date: 08/18/21 Room / Location:  ENDOSCOPIC ULTRASOUND ROOM / SURGERY [facility]    Anesthesia Start: 0904 Anesthesia Stop: 0932    Procedures:       COLONOSCOPY  - WITH BIOPSIES and SNARE POLYPECTOMY (Abdomen)      COLONOSCOPY, FLEXIBLE, WITH ENDOSCOPIC US - OF ANAL SPHINCTER Diagnosis: (DIARRHEA, INCONTINENCE OF FECES; pending pathology)    Surgeons: Shravan Todd M.D. Responsible Provider: Royer Carson M.D.    Anesthesia Type: MAC ASA Status: 2          Final Anesthesia Type: MAC  Last vitals  BP   Blood Pressure : 139/67    Temp   36.4 °C (97.5 °F)    Pulse   78   Resp   16    SpO2   99 %      Anesthesia Post Evaluation    Patient location during evaluation: PACU  Patient participation: complete - patient participated  Level of consciousness: awake and alert  Pain score: 0    Airway patency: patent  Anesthetic complications: no  Cardiovascular status: hemodynamically stable  Respiratory status: acceptable  Hydration status: euvolemic    PONV: none          There were no known complications for this encounter.     Nurse Pain Score: 0 (NPRS)

## 2021-08-18 NOTE — OR NURSING
0931: To PACU post colonoscopy. Pt is extubated, breathing is spontaneous and unlabored. Not yet responsive.  0942: Pt is awake. No pain or nausea.  0945: Dr. Todd at bedside.  1025: Pt remains pain/nausea free.

## 2021-08-18 NOTE — H&P
Physician H&P    Patient ID:  Roseline Redding  [id number]  76 y.o. female  1945    History:  Primary Diagnosis: fecal incontinence    HPI:  Diarrhea. Denied further modifying factors, associated symptoms or timing issues.  Of note, I spoke to patient's daughter (Kaylyn) about patient to obtain further history.        Past Medical History:  has a past medical history of Allergic rhinitis, Arrhythmia, Arthritis, Breast cancer (HCC), Bronchitis (2019), Bruxism, Cancer (HCC) (2012), CATARACT, Chickenpox, GERD (gastroesophageal reflux disease), Argentine measles, Glaucoma, Heart valve disease, Hemorrhagic disorder (HCC), Hypothyroidism, Indigestion, Influenza, Mumps, Pain, Restless leg syndrome, Sleep apnea, Snoring, TMJ (dislocation of temporomandibular joint), Tonsillitis, Unspecified disorder of thyroid, Unspecified urinary incontinence, Whooping cough, and Whooping cough (1950).thrombocytopenia, tremors, mitral valve prolapse  Past Surgical History:  has a past surgical history that includes other (2003); other (2006); lumpectomy (10/19/2012); node biopsy sentinel (10/19/2012); stereotactic biopsy (Left, 01/26/2017); pr remv 2nd cataract,corn-scler sectn; tonsillectomy; arthroscopy, knee; sinuscope; us-needle core bx-breast panel; pr total knee arthroplasty (Right, 6/7/2021); other orthopedic surgery; finger arthroplasty (1/15/2010); tendon transfer (1/15/2010); other orthopedic surgery; knee arthroplasty total (Left, 2019); and knee arthroplasty total (Right, 06/2021).  Past Social History:  reports that she quit smoking about 38 years ago. Her smoking use included cigarettes. She has a 22.50 pack-year smoking history. She has never used smokeless tobacco. She reports previous alcohol use. She reports that she does not use drugs.  Past Family History:   Family History   Problem Relation Age of Onset   • Lung Cancer Father    • Hypertension Brother    • No Known Problems Daughter    • Sleep Apnea Neg Hx   "    Allergies: Codeine    Current Medications:  Prior to Admission medications    Medication Sig Start Date End Date Taking? Authorizing Provider   Homeopathic Products (ZICAM ALLERGY RELIEF NA) Take 10 mg by mouth as needed.    Physician Outpatient   latanoprost (XALATAN) 0.005 % Solution latanoprost 0.005 % eye drops    Physician Outpatient   levothyroxine (SYNTHROID) 50 MCG Tab Take 50 mcg by mouth every day. 4/3/17   Physician Outpatient   anastrozole (ARIMIDEX) 1 MG Tab Take 1 Tab by mouth every day. 1/27/17   Physician Outpatient   sertraline (ZOLOFT) 100 MG Tab Take 1 Tab by mouth every day. 11/28/16   Physician Outpatient       Review of Systems:  Review of Systems   Constitutional: Positive for weight loss. Negative for malaise/fatigue.   HENT: Negative for sinus pain.    Eyes: Negative for photophobia and redness.   Respiratory: Negative for cough, shortness of breath and stridor.    Cardiovascular: Negative for chest pain and leg swelling.   Gastrointestinal: Positive for diarrhea. Negative for blood in stool.   Genitourinary: Negative for flank pain and hematuria.   Musculoskeletal: Positive for joint pain. Negative for falls.   Skin: Negative for itching.   Neurological: Negative for seizures.   Endo/Heme/Allergies: Negative for polydipsia. Bruises/bleeds easily.   Psychiatric/Behavioral: Negative for substance abuse. The patient is not nervous/anxious.    All other systems reviewed and are negative.    /67   Pulse 78   Temp 36.4 °C (97.5 °F) (Temporal)   Resp 16   Ht 1.6 m (5' 3\")   Wt 60.3 kg (132 lb 15 oz)   SpO2 99%     Physical Examination:  Physical Exam  Vitals and nursing note reviewed. Exam conducted with a chaperone present.   Constitutional:       General: She is not in acute distress.     Appearance: Normal appearance. She is not ill-appearing, toxic-appearing or diaphoretic.   HENT:      Head: Normocephalic and atraumatic.      Nose: Nose normal. No congestion or rhinorrhea.     "  Mouth/Throat:      Mouth: Mucous membranes are moist.      Pharynx: Oropharynx is clear.   Eyes:      General: No scleral icterus.     Extraocular Movements: Extraocular movements intact.      Conjunctiva/sclera: Conjunctivae normal.   Cardiovascular:      Rate and Rhythm: Normal rate and regular rhythm.      Pulses: Normal pulses.   Pulmonary:      Effort: Pulmonary effort is normal. No respiratory distress.      Breath sounds: No stridor.   Abdominal:      General: Abdomen is flat. There is no distension.      Palpations: Abdomen is soft.      Tenderness: There is no abdominal tenderness. There is no guarding.   Musculoskeletal:         General: Normal range of motion.      Cervical back: Normal range of motion and neck supple.      Right lower leg: No edema.      Left lower leg: No edema.   Skin:     General: Skin is warm.      Coloration: Skin is not jaundiced.   Neurological:      General: No focal deficit present.      Mental Status: She is alert. Mental status is at baseline.      Comments: tremor   Psychiatric:         Mood and Affect: Mood normal.         Thought Content: Thought content normal.         Judgment: Judgment normal.         Impression:  Fecal incontinence   Diarrhea    Plan:  Patient seen and examined before proceeding with colonoscopy biopsies and lower endoscopic ultrasound of anal sphincters under anesthesia with possible snare polypectomy(ies) and/or other endotherapy. Risks, benefits, and alternatives of aforementioned procedures were discussed with patient and daughter (Kaylyn) in detail before proceeding.  Consenting persons were given opportunities to ask questions and discuss other options.  Risks including but not limited to perforation, infection, bleeding, missed lesion(s), possible need for surgery(ies) and/or interventional radiology, possible need for repeat procedure(s) and/or additional testing, hospitalization possibly prolonged, cardiac and/or pulmonary event, aspiration,  hypoxia, stroke, medication (s) and/or anesthesia reaction(s), indefinite diagnosis, discomfort/pain, unsuccessful and/or incomplete procedure, ineffective therapy, persistent symptoms, damage to adjacent organs/structure and/or vascular such as splenic laceration, and other adverse event(s) possibly life-threatening.  Interactive discussion was undertaken with Layman's terms.  I answered questions in full and to satisfaction.  I gave opportunity to cancel, delay and/or reschedule if not completely comfortable with proceeding.  Consenting persons stated understanding and acceptance of these risks, and wished to proceed.   Informed consent was given in clear state of mind and paper permit was confirmed to have been signed before proceeding.    Shravan Todd M.D.  8/18/2021

## 2021-08-18 NOTE — ANESTHESIA TIME REPORT
Anesthesia Start and Stop Event Times     Date Time Event    8/18/2021 0856 Ready for Procedure     0904 Anesthesia Start     0932 Anesthesia Stop        Responsible Staff  08/18/21    Name Role Begin End    Royer Carson M.D. Anesth 0904 0932        Preop Diagnosis (Free Text):  Pre-op Diagnosis     DIARRHEA, INCONTINENCE OF FECES        Preop Diagnosis (Codes):    Post op Diagnosis  Incontinence of feces      Premium Reason  Non-Premium    Comments:

## 2021-08-18 NOTE — ANESTHESIA PREPROCEDURE EVALUATION
Relevant Problems   ANESTHESIA   (positive) CLAUDIA (obstructive sleep apnea)       Physical Exam    Airway   Mallampati: II  TM distance: >3 FB  Neck ROM: full       Cardiovascular - normal exam  Rhythm: regular  Rate: normal  (-) murmur     Dental - normal exam           Pulmonary - normal exam  Breath sounds clear to auscultation     Abdominal    Neurological - normal exam                 Anesthesia Plan    ASA 2       Plan - MAC               Induction: intravenous    Postoperative Plan: Postoperative administration of opioids is intended.    Pertinent diagnostic labs and testing reviewed    Informed Consent:    Anesthetic plan and risks discussed with patient.    Use of blood products discussed with: patient whom consented to blood products.

## 2021-08-18 NOTE — PROCEDURES
Pre-procedure Diagnoses   Chronic diarrhea [K52.9]   Incontinence of feces with fecal urgency [R15.9, R15.2]   Post-procedure Diagnoses   Colonic diverticular disease [K57.30]   Grade II hemorrhoids [K64.1]   Pelvic floor dysfunction in female [M62.89]   Procedures   COLONOSCOPY WITH BIOPSIES   FLEXIBLE SIGMOIDOSCOPY WITH LOWER ENDOSCOPIC US EXAM OF ANAL SPHINCTERS       Endoscopist: Shravan Todd MD, [facility], [facility]    Monitored Anesthesia care: Dr. Royer Carson    Consent: Patient seen and examined before proceeding. Risks, benefits, and alternatives of aforementioned procedures were discussed with patient and daughter (Kaylyn) in detail before proceeding.  Consenting persons were given opportunities to ask questions and discuss other options.  Risks including but not limited to perforation, infection, bleeding, missed lesion(s), possible need for surgery(ies) and/or interventional radiology, possible need for repeat procedure(s) and/or additional testing, hospitalization possibly prolonged, cardiac and/or pulmonary event, aspiration, hypoxia, stroke, medication (s) and/or anesthesia reaction(s), indefinite diagnosis, discomfort/pain, unsuccessful and/or incomplete procedure, ineffective therapy, persistent symptoms, damage to adjacent organs/structure and/or vascular such as splenic laceration, and other adverse event(s) possibly life-threatening.  Interactive discussion was undertaken with Layman's terms.  I answered questions in full and to satisfaction.  I gave opportunity to cancel, delay and/or reschedule if not completely comfortable with proceeding.  Consenting persons stated understanding and acceptance of these risks, and wished to proceed.   Informed consent was given in clear state of mind and paper permit was confirmed to have been signed before proceeding.      Endoscopic procedures in detail:  Adult long-variable stiffness flexible Olympus colonoscopy was inserted into anus and rectum, retroflexion  was performed in rectum, colonoscope was advanced to cecum, appendiceal orifice and ileocecal valve were identified, terminal ileum was intubated and inspected, water flush was used to wash mucosa and suction of colonic fluid contents was performed to optimize visualization.  I performed photodocumentation of the terminal ileum, cecum, appendical orifice, ileocecal value, retroflexion view in rectum and anus channel. Terminal ileum and random colon biopsies were obtained and sent in separate containers. Cecal wthdrawal time was greater than 6 minutes.  I suctioned insufflated air and fluid contents upon removal.     Radial Olympus flexible echoendoscope was inserted from anus to rectum and advanced to sigmoid colon.  Anal sphincters were imaged to look for deficits/tears and sphincter thickness was measured. Multiple pull-through surveys were performed with imaging of bladder and vagina for anatomical landmarks. I suctioned insufflated air fluid contents upon removal.    Procedure times:  - In-room 09:04  - Start 09:08  - Cecal intubation 09:10  - Completed 09:28  - Out of room per nursing records    Colonoscopy Findings:  - Bowel preparation: excellent  - Terminal ileum: endoscopically unremarkable.  - Colon: pan-diverticulosis, medium-lumen size, moderate severity.  - Rectum: grade 2 non-bleeding hemorrhoids.    Flexible sigmoidoscopy lower Endoscopic Ultrasound Findings:   - Anal sphincters: no clear deficits or complete tears but the anterior aspect for external anal sphincter was thinned to less than 2mm and the posterior aspect was markedly thickened to greater than 1.6mm.    Impression:  1. Suspected pelvic floor dysfunction with anal sphincters complete tear  2. Colonic diverticulosis  3. Random colon and terminal ileum biopsies obtained.    Recommendations:   1.  Routine post-endoscopy anesthesia recovery care.  Discharge when when recovery criteria are met.  Aspiration and fall precautions x 24 hours.   2.   Follow-up biopsies with established GI Dr. Ashely Orta to consider biofeedback.   3.  I spoke to patient, daughter (Kaylyn) and recovery nurse about impression, diagnosis and recommendations.  I answered questions in full and to satisfaction

## 2021-10-12 ENCOUNTER — HOSPITAL ENCOUNTER (OUTPATIENT)
Dept: RADIOLOGY | Facility: MEDICAL CENTER | Age: 76
End: 2021-10-12
Attending: FAMILY MEDICINE
Payer: MEDICARE

## 2021-10-12 ENCOUNTER — HOSPITAL ENCOUNTER (OUTPATIENT)
Dept: RADIOLOGY | Facility: MEDICAL CENTER | Age: 76
End: 2021-10-12
Attending: INTERNAL MEDICINE
Payer: MEDICARE

## 2021-10-12 DIAGNOSIS — Z12.31 VISIT FOR SCREENING MAMMOGRAM: ICD-10-CM

## 2021-10-12 DIAGNOSIS — M85.89 DISAPPEARING BONE DISEASE: ICD-10-CM

## 2021-10-12 DIAGNOSIS — C50.912 MALIGNANT NEOPLASM OF LEFT FEMALE BREAST, UNSPECIFIED ESTROGEN RECEPTOR STATUS, UNSPECIFIED SITE OF BREAST (HCC): ICD-10-CM

## 2021-10-12 PROCEDURE — 77080 DXA BONE DENSITY AXIAL: CPT

## 2021-10-12 PROCEDURE — 77063 BREAST TOMOSYNTHESIS BI: CPT

## 2022-01-03 ENCOUNTER — HOSPITAL ENCOUNTER (OUTPATIENT)
Dept: RADIOLOGY | Facility: MEDICAL CENTER | Age: 77
End: 2022-01-03
Attending: PHYSICIAN ASSISTANT
Payer: MEDICARE

## 2022-01-03 DIAGNOSIS — M25.551 RIGHT HIP PAIN: ICD-10-CM

## 2022-01-03 DIAGNOSIS — S32.691A: ICD-10-CM

## 2022-01-03 DIAGNOSIS — S32.511A CLOSED FRACTURE OF SUPERIOR RAMUS OF RIGHT PUBIS, INITIAL ENCOUNTER (HCC): ICD-10-CM

## 2022-01-03 PROCEDURE — 73721 MRI JNT OF LWR EXTRE W/O DYE: CPT | Mod: RT,MH

## 2022-01-03 PROCEDURE — 72195 MRI PELVIS W/O DYE: CPT | Mod: MH

## 2022-01-31 ENCOUNTER — HOSPITAL ENCOUNTER (OUTPATIENT)
Dept: LAB | Facility: MEDICAL CENTER | Age: 77
End: 2022-01-31
Attending: PHYSICIAN ASSISTANT
Payer: MEDICARE

## 2022-01-31 ENCOUNTER — HOSPITAL ENCOUNTER (OUTPATIENT)
Dept: RADIOLOGY | Facility: MEDICAL CENTER | Age: 77
End: 2022-01-31
Attending: PHYSICIAN ASSISTANT
Payer: MEDICARE

## 2022-01-31 DIAGNOSIS — S92.504D CLOSED NONDISPLACED FRACTURE OF PHALANX OF LESSER TOE OF RIGHT FOOT WITH ROUTINE HEALING, UNSPECIFIED PHALANX, SUBSEQUENT ENCOUNTER: ICD-10-CM

## 2022-01-31 LAB
BASOPHILS # BLD AUTO: 0.8 % (ref 0–1.8)
BASOPHILS # BLD: 0.05 K/UL (ref 0–0.12)
CRP SERPL HS-MCNC: <0.3 MG/DL (ref 0–0.75)
EOSINOPHIL # BLD AUTO: 0.11 K/UL (ref 0–0.51)
EOSINOPHIL NFR BLD: 1.8 % (ref 0–6.9)
ERYTHROCYTE [DISTWIDTH] IN BLOOD BY AUTOMATED COUNT: 44.8 FL (ref 35.9–50)
ERYTHROCYTE [SEDIMENTATION RATE] IN BLOOD BY WESTERGREN METHOD: 7 MM/HOUR (ref 0–25)
HCT VFR BLD AUTO: 42.4 % (ref 37–47)
HGB BLD-MCNC: 14 G/DL (ref 12–16)
IMM GRANULOCYTES # BLD AUTO: 0.02 K/UL (ref 0–0.11)
IMM GRANULOCYTES NFR BLD AUTO: 0.3 % (ref 0–0.9)
LYMPHOCYTES # BLD AUTO: 1.67 K/UL (ref 1–4.8)
LYMPHOCYTES NFR BLD: 27.4 % (ref 22–41)
MCH RBC QN AUTO: 26.5 PG (ref 27–33)
MCHC RBC AUTO-ENTMCNC: 33 G/DL (ref 33.6–35)
MCV RBC AUTO: 80.2 FL (ref 81.4–97.8)
MONOCYTES # BLD AUTO: 0.45 K/UL (ref 0–0.85)
MONOCYTES NFR BLD AUTO: 7.4 % (ref 0–13.4)
NEUTROPHILS # BLD AUTO: 3.79 K/UL (ref 2–7.15)
NEUTROPHILS NFR BLD: 62.3 % (ref 44–72)
NRBC # BLD AUTO: 0 K/UL
NRBC BLD-RTO: 0 /100 WBC
PLATELET # BLD AUTO: 152 K/UL (ref 164–446)
PMV BLD AUTO: 9.9 FL (ref 9–12.9)
PROCALCITONIN SERPL-MCNC: <0.02 NG/ML
RBC # BLD AUTO: 5.29 M/UL (ref 4.2–5.4)
WBC # BLD AUTO: 6.1 K/UL (ref 4.8–10.8)

## 2022-01-31 PROCEDURE — 73718 MRI LOWER EXTREMITY W/O DYE: CPT | Mod: RT,MH

## 2022-01-31 PROCEDURE — 86140 C-REACTIVE PROTEIN: CPT

## 2022-01-31 PROCEDURE — 85025 COMPLETE CBC W/AUTO DIFF WBC: CPT

## 2022-01-31 PROCEDURE — 36415 COLL VENOUS BLD VENIPUNCTURE: CPT

## 2022-01-31 PROCEDURE — 85652 RBC SED RATE AUTOMATED: CPT

## 2022-01-31 PROCEDURE — 84145 PROCALCITONIN (PCT): CPT

## 2022-05-22 ENCOUNTER — HOSPITAL ENCOUNTER (OUTPATIENT)
Dept: RADIOLOGY | Facility: MEDICAL CENTER | Age: 77
End: 2022-05-22
Attending: STUDENT IN AN ORGANIZED HEALTH CARE EDUCATION/TRAINING PROGRAM
Payer: MEDICARE

## 2022-05-22 ENCOUNTER — HOSPITAL ENCOUNTER (EMERGENCY)
Facility: MEDICAL CENTER | Age: 77
End: 2022-05-22
Payer: MEDICARE

## 2022-05-22 DIAGNOSIS — G43.809 OTHER MIGRAINE, NOT INTRACTABLE, WITHOUT STATUS MIGRAINOSUS: ICD-10-CM

## 2022-05-22 PROCEDURE — A9576 INJ PROHANCE MULTIPACK: HCPCS | Performed by: STUDENT IN AN ORGANIZED HEALTH CARE EDUCATION/TRAINING PROGRAM

## 2022-05-22 PROCEDURE — 700117 HCHG RX CONTRAST REV CODE 255: Performed by: STUDENT IN AN ORGANIZED HEALTH CARE EDUCATION/TRAINING PROGRAM

## 2022-05-22 PROCEDURE — 70553 MRI BRAIN STEM W/O & W/DYE: CPT | Mod: MH

## 2022-05-22 RX ADMIN — GADOTERIDOL 15 ML: 279.3 INJECTION, SOLUTION INTRAVENOUS at 14:14

## 2022-06-22 ENCOUNTER — HOSPITAL ENCOUNTER (OUTPATIENT)
Dept: RADIOLOGY | Facility: MEDICAL CENTER | Age: 77
End: 2022-06-22
Attending: SPECIALIST
Payer: MEDICARE

## 2022-06-22 DIAGNOSIS — G25.0 BENIGN ESSENTIAL TREMOR: ICD-10-CM

## 2022-06-22 DIAGNOSIS — H53.9 UNSPECIFIED VISUAL DISTURBANCE: ICD-10-CM

## 2022-06-22 PROCEDURE — 70496 CT ANGIOGRAPHY HEAD: CPT | Mod: MH

## 2022-06-22 PROCEDURE — 70498 CT ANGIOGRAPHY NECK: CPT | Mod: MH

## 2022-06-22 PROCEDURE — 700117 HCHG RX CONTRAST REV CODE 255: Performed by: SPECIALIST

## 2022-06-22 RX ADMIN — IOHEXOL 100 ML: 350 INJECTION, SOLUTION INTRAVENOUS at 15:30

## 2022-08-26 ENCOUNTER — OFFICE VISIT (OUTPATIENT)
Dept: URGENT CARE | Facility: CLINIC | Age: 77
End: 2022-08-26
Payer: MEDICARE

## 2022-08-26 VITALS
TEMPERATURE: 98 F | HEART RATE: 68 BPM | DIASTOLIC BLOOD PRESSURE: 78 MMHG | WEIGHT: 138 LBS | SYSTOLIC BLOOD PRESSURE: 120 MMHG | OXYGEN SATURATION: 95 % | RESPIRATION RATE: 16 BRPM | BODY MASS INDEX: 24.45 KG/M2 | HEIGHT: 63 IN

## 2022-08-26 DIAGNOSIS — J30.1 SEASONAL ALLERGIC RHINITIS DUE TO POLLEN: ICD-10-CM

## 2022-08-26 DIAGNOSIS — R09.81 NASAL CONGESTION: ICD-10-CM

## 2022-08-26 DIAGNOSIS — J01.41 ACUTE RECURRENT PANSINUSITIS: ICD-10-CM

## 2022-08-26 DIAGNOSIS — R51.9 SINUS HEADACHE: ICD-10-CM

## 2022-08-26 DIAGNOSIS — H93.8X3 FULLNESS IN EAR, BILATERAL: ICD-10-CM

## 2022-08-26 PROCEDURE — 99214 OFFICE O/P EST MOD 30 MIN: CPT | Performed by: NURSE PRACTITIONER

## 2022-08-26 RX ORDER — DOXYCYCLINE HYCLATE 100 MG
100 TABLET ORAL 2 TIMES DAILY
Qty: 14 TABLET | Refills: 0 | Status: SHIPPED | OUTPATIENT
Start: 2022-08-26 | End: 2022-09-02

## 2022-08-26 NOTE — PROGRESS NOTES
"Subjective:   Roseline Redding is a 77 y.o. female who presents for Sinus Problem (X 6 day, nasal congestion, head pressure, ear fullness)       HPI  Patient presents for evaluation of 6-day history of sinus pressure and pain, headache, ear fullness, and nasal congestion.  Patient has known seasonal allergic rhinitis, has been taking Zyrtec on a daily basis.  Denies any known ill contacts or exposures.  Has a history of recurrent chronic sinusitis.    ROS  All other systems are negative except as documented above within HPI.    MEDS:   Current Outpatient Medications:     alendronate (FOSAMAX) 70 MG Tab, , Disp: , Rfl:     EPINEPHrine (EPIPEN) 0.3 MG/0.3ML Solution Auto-injector solution for injection, , Disp: , Rfl:     Homeopathic Products (ZICAM ALLERGY RELIEF NA), Take 10 mg by mouth as needed., Disp: , Rfl:     latanoprost (XALATAN) 0.005 % Solution, latanoprost 0.005 % eye drops, Disp: , Rfl:     levothyroxine (SYNTHROID) 50 MCG Tab, Take 50 mcg by mouth every day., Disp: , Rfl:     anastrozole (ARIMIDEX) 1 MG Tab, Take 1 Tab by mouth every day., Disp: , Rfl:     sertraline (ZOLOFT) 100 MG Tab, Take 1 Tab by mouth every day., Disp: , Rfl:   ALLERGIES:   Allergies   Allergen Reactions    Codeine      dizziness       Patient's PMH, SocHx, SurgHx, FamHx, Drug allergies and medications were reviewed.     Objective:   /78 (BP Location: Left arm, Patient Position: Sitting, BP Cuff Size: Adult)   Pulse 68   Temp 36.7 °C (98 °F) (Temporal)   Resp 16   Ht 1.6 m (5' 3\")   Wt 62.6 kg (138 lb)   SpO2 95%   BMI 24.45 kg/m²     Physical Exam  Vitals and nursing note reviewed.   Constitutional:       General: She is awake.      Appearance: Normal appearance. She is well-developed.   HENT:      Head: Normocephalic and atraumatic.      Right Ear: Tympanic membrane, ear canal and external ear normal.      Left Ear: Tympanic membrane, ear canal and external ear normal.      Nose: Nasal tenderness, mucosal edema, " congestion and rhinorrhea present.      Right Turbinates: Enlarged.      Left Turbinates: Enlarged.      Right Sinus: Maxillary sinus tenderness present.      Left Sinus: Maxillary sinus tenderness present.      Mouth/Throat:      Lips: Pink.      Mouth: Mucous membranes are moist.      Pharynx: Oropharynx is clear. Uvula midline.   Eyes:      Extraocular Movements: Extraocular movements intact.      Conjunctiva/sclera: Conjunctivae normal.   Cardiovascular:      Rate and Rhythm: Normal rate and regular rhythm.      Pulses: Normal pulses.      Heart sounds: Normal heart sounds.   Pulmonary:      Effort: Pulmonary effort is normal.      Breath sounds: Normal breath sounds.   Musculoskeletal:         General: Normal range of motion.      Cervical back: Normal range of motion and neck supple.   Skin:     General: Skin is warm and dry.   Neurological:      General: No focal deficit present.      Mental Status: She is alert and oriented to person, place, and time.   Psychiatric:         Mood and Affect: Mood normal.         Behavior: Behavior normal. Behavior is cooperative.         Thought Content: Thought content normal.         Judgment: Judgment normal.       Assessment/Plan:   Assessment      1. Acute recurrent pansinusitis  - doxycycline (VIBRAMYCIN) 100 MG Tab; Take 1 Tablet by mouth 2 times a day for 7 days.  Dispense: 14 Tablet; Refill: 0    2. Seasonal allergic rhinitis due to pollen    3. Nasal congestion    4. Sinus headache    5. Fullness in ear, bilateral      Vital signs stable at today's acute urgent care visit. Begin medications as listed.    Advised the patient to follow-up with the primary care provider/urgent care if symptoms persist.  Red flags discussed and indications to immediately call 911 or present to the ED. All questions were encouraged and answered to the patient's satisfaction and understanding, and they agree to the plan of care.     This is an acute problem with uncertain prognosis,  medication management and instructions as well as management options were provided.  I personally reviewed prior external notes and test results pertinent to today and independently reviewed and interpreted all diagnostics. Time spent evaluating this patient includes preparing for visit, counseling/education, exam, evaluation, obtaining history, and ordering lab/test/procedures.      Please note that this dictation was created using voice recognition software. I have made a reasonable attempt to correct obvious errors, but I expect that there are errors of grammar and possibly content that I did not discover before finalizing the note.

## 2022-09-14 ENCOUNTER — OFFICE VISIT (OUTPATIENT)
Dept: SLEEP MEDICINE | Facility: MEDICAL CENTER | Age: 77
End: 2022-09-14
Payer: MEDICARE

## 2022-09-14 VITALS
HEIGHT: 63 IN | WEIGHT: 141 LBS | OXYGEN SATURATION: 96 % | BODY MASS INDEX: 24.98 KG/M2 | RESPIRATION RATE: 14 BRPM | DIASTOLIC BLOOD PRESSURE: 64 MMHG | HEART RATE: 66 BPM | SYSTOLIC BLOOD PRESSURE: 112 MMHG

## 2022-09-14 DIAGNOSIS — G47.33 OSA (OBSTRUCTIVE SLEEP APNEA): Primary | ICD-10-CM

## 2022-09-14 PROCEDURE — 99213 OFFICE O/P EST LOW 20 MIN: CPT | Performed by: STUDENT IN AN ORGANIZED HEALTH CARE EDUCATION/TRAINING PROGRAM

## 2022-09-14 NOTE — PROGRESS NOTES
"[facility] Follow-up Visit    CC: Follow-up regarding obstructive sleep apnea      HPI:  Roseline Redding is a 77 y.o.female  with TMJ, hypothyroidism, GERD, bruxism, allergic rhinitis, and obstructive sleep apnea on CPAP.  Presents today regarding obstructive sleep apnea.    She has received her new machine from [employer].  It is a DreamStation 2.  She received it approximately 2 weeks ago.  She has only been able to use it a few nights due to trouble with her tubing.  She is finding that the tubing will dislodge in the night which makes it difficult to use the whole night.    DME provider: [facility]   Device: Dreamstation 2  Mask: nasal pillows   Aerophagia: No   Snoring: No   Dry mouth: No   Leak: No   Skin irritation: No   Chin strap: No     Sleep History  5/10/2017 PSG split-night study showed severe obstructive sleep apnea overall AHI 50, minimum oxygen saturation 83%, responded well to CPAP therapy.  Recommended pressure of 6 cm water.    Patient Active Problem List    Diagnosis Date Noted    CLAUDIA (obstructive sleep apnea) 05/09/2019    Malignant neoplasm of other specified sites of female breast 10/19/2012       Past Medical History:   Diagnosis Date    Allergic rhinitis     Arrhythmia     MVP    Arthritis     knees/rt thumb    Breast cancer (HCC)     Bronchitis 2019    Bruxism     Cancer (HCC) 2012    breast, left    CATARACT     Bilateral IOL    Chickenpox     GERD (gastroesophageal reflux disease)     [place] measles     Glaucoma     Bilateral    Heart valve disease     MVP    Hemorrhagic disorder (HCC)     \"Low platelets\"    Hypothyroidism     Indigestion     Influenza     Mumps     Pain     Restless leg syndrome     Sleep apnea     Snoring     TMJ (dislocation of temporomandibular joint)     Tonsillitis     Unspecified disorder of thyroid     Unspecified urinary incontinence     sneezing/coughing    Whooping cough     Whooping cough 1950        Past Surgical History:   Procedure Laterality " Date    OH COLONOSCOPY,DIAGNOSTIC  8/18/2021    Procedure: COLONOSCOPY  - WITH BIOPSIES and SNARE POLYPECTOMY;  Surgeon: Shravan Todd M.D.;  [facility];  Service: EUS    OH COLONOSCOPY W/ENDOSCOPE US  8/18/2021    Procedure: COLONOSCOPY, FLEXIBLE, WITH ENDOSCOPIC US - OF ANAL SPHINCTER;  Surgeon: Shravan Todd M.D.;  Location: SURGERY [facility];  Service: EUS    PB TOTAL KNEE ARTHROPLASTY Right 6/7/2021    Procedure: RIGHT TOTAL KNEE ARTHROPLASTY;  Surgeon: Andrade Kwok M.D.;  Location: [facility];  Service: Orthopedics    KNEE ARTHROPLASTY TOTAL Right 06/2021    KNEE ARTHROPLASTY TOTAL Left 2019    STEREOTACTIC BIOPSY Left 01/26/2017    LUMPECTOMY  10/19/2012    Performed by Efrain Eagle M.D. at SURGERY [facility]    NODE BIOPSY SENTINEL  10/19/2012    Performed by Efrain Eagle M.D. at SURGERY [facility]    FINGER ARTHROPLASTY  1/15/2010    Performed by EFRAIN WILKINS [facility]    TENDON TRANSFER  1/15/2010    Performed by EFRAIN WILKINS at [facility]    OTHER  2006    face lift    OTHER  2003    sinus     ARTHROSCOPY, KNEE      OTHER ORTHOPEDIC SURGERY      rt and lft meniscus    OTHER ORTHOPEDIC SURGERY      lft toes    OH REMV 2ND CATARACT,CORN-SCLER SECTN      SINUSCOPE      TONSILLECTOMY      US-NEEDLE CORE BX-BREAST PANEL         Family History   Problem Relation Age of Onset    Lung Cancer Father     Hypertension Brother     No Known Problems Daughter     Sleep Apnea Neg Hx        Social History     Socioeconomic History    Marital status:      Spouse name: Not on file    Number of children: Not on file    Years of education: Not on file    Highest education level: Not on file   Occupational History    Not on file   Tobacco Use    Smoking status: Former     Packs/day: 1.50     Years: 15.00     Pack years: 22.50     Types: Cigarettes     Quit date: 2/13/1983     Years since  "quittin.6    Smokeless tobacco: Never    Tobacco comments:     1.5 pks  a day for a total of 15 yrs   Vaping Use    Vaping Use: Never used   Substance and Sexual Activity    Alcohol use: Not Currently     Comment: \"Not since \"    Drug use: No     Comment: \"CBD for tremors\"    Sexual activity: Not on file   Other Topics Concern    Not on file   Social History Narrative    Not on file     Social Determinants of Health     Financial Resource Strain: Not on file   Food Insecurity: Not on file   Transportation Needs: Not on file   Physical Activity: Not on file   Stress: Not on file   Social Connections: Not on file   Intimate Partner Violence: Not on file   Housing Stability: Not on file       Current Outpatient Medications   Medication Sig Dispense Refill    alendronate (FOSAMAX) 70 MG Tab       EPINEPHrine (EPIPEN) 0.3 MG/0.3ML Solution Auto-injector solution for injection       Homeopathic Products (ZICAM ALLERGY RELIEF NA) Take 10 mg by mouth as needed.      latanoprost (XALATAN) 0.005 % Solution latanoprost 0.005 % eye drops      levothyroxine (SYNTHROID) 50 MCG Tab Take 50 mcg by mouth every day.      anastrozole (ARIMIDEX) 1 MG Tab Take 1 Tab by mouth every day.      sertraline (ZOLOFT) 100 MG Tab Take 1 Tab by mouth every day.       No current facility-administered medications for this visit.        ALLERGIES: Codeine    ROS  Constitutional: Denies fevers, Denies weight changes  Ears/Nose/Throat/Mouth: Denies nasal congestion or sore throat   Cardiovascular: Denies chest pain  Respiratory: Denies shortness of breath, Denies cough  Gastrointestinal/Hepatic: Denies nausea, vomiting  Sleep: see HPI      PHYSICAL EXAM  /64 (BP Location: Left arm, Patient Position: Sitting, BP Cuff Size: Large adult)   Pulse 66   Resp 14   Ht 1.6 m (5' 3\")   Wt 64 kg (141 lb)   SpO2 96%   BMI 24.98 kg/m²   Appearance: Well-nourished, well-developed, no acute distress  Eyes:  No scleral icterus , EOMI  ENMT: " masked  Musculoskeletal:  Grossly normal; gait and station normal; digits and nails normal  Skin:  No rashes, petechiae, cyanosis  Neurologic: without focal signs; oriented to person, time, place, and purpose; judgement intact      Medical Decision Making   Assessment and Plan    Roseline Redding is a 77 y.o.female  with TMJ, hypothyroidism, GERD, bruxism, allergic rhinitis, and obstructive sleep apnea on CPAP.  Presents today regarding obstructive sleep apnea.    The medical record was reviewed.    Obstructive sleep apnea  Diagnostic and titration nocturnal polysomnogram's study reports.    We currently do not have remote access to her machine and she did not bring in memory card from her new machine.  Patient does report benefit from using CPAP in the past.  Suspect that with the transition to the new machine equipment is not the best compatibility.  Therefore would benefit from getting a new supply order and establishing this with her DME company.    PLAN:   -Order placed for mask and supplies   -Advised to reach out via MyChart with questions     Has been advised to continue the current CPAP, clean equipment frequently, and get new mask and supplies as allowed by insurance and DME. Recommend an earlier appointment, if significant treatment barriers develop.    The risks of untreated sleep apnea were discussed with the patient at length. Patients with CLAUDIA are at increased risk of cardiovascular disease including coronary artery disease, systemic arterial hypertension, pulmonary arterial hypertension, cardiac arrythmias, and stroke. The patient was advised to avoid driving a motor vehicle when drowsy.    Positive airway pressure will favorably impact many of the adverse conditions and effects provoked by CLAUDIA.    Have advised the patient to follow up with the appropriate healthcare practitioners for all other medical problems and issues.    Return in about 2 months (around 11/14/2022).      Please note portions  of this record was created using voice recognition software. I have made every reasonable attempt to correct obvious errors, but I expect that there are errors of grammar and possibly content I did not discover before finalizing the note.

## 2022-10-04 ENCOUNTER — PRE-ADMISSION TESTING (OUTPATIENT)
Dept: ADMISSIONS | Facility: MEDICAL CENTER | Age: 77
End: 2022-10-04
Attending: ORTHOPAEDIC SURGERY
Payer: MEDICARE

## 2022-10-04 RX ORDER — DOXYCYCLINE HYCLATE 100 MG
TABLET ORAL
COMMUNITY
End: 2022-10-04

## 2022-10-04 RX ORDER — OXYBUTYNIN CHLORIDE 10 MG/1
TABLET, EXTENDED RELEASE ORAL
COMMUNITY
End: 2022-10-04

## 2022-10-04 RX ORDER — LOPERAMIDE HCL 1 MG/7.5ML
1 SUSPENSION ORAL 4 TIMES DAILY PRN
COMMUNITY

## 2022-10-04 RX ORDER — PROPRANOLOL HYDROCHLORIDE 10 MG/1
10 TABLET ORAL 2 TIMES DAILY
COMMUNITY
End: 2023-11-18

## 2022-10-04 RX ORDER — CETIRIZINE HYDROCHLORIDE 10 MG/1
10 CAPSULE, LIQUID FILLED ORAL DAILY
COMMUNITY

## 2022-10-04 RX ORDER — VALACYCLOVIR HYDROCHLORIDE 1 G/1
TABLET, FILM COATED ORAL
COMMUNITY
End: 2022-10-04

## 2022-10-04 RX ORDER — HYDROCODONE BITARTRATE AND ACETAMINOPHEN 5; 325 MG/1; MG/1
TABLET ORAL
COMMUNITY
Start: 2022-09-26 | End: 2022-10-04

## 2022-10-04 RX ORDER — AMOXICILLIN 500 MG/1
CAPSULE ORAL
COMMUNITY
End: 2023-11-18

## 2022-10-04 RX ORDER — ALPRAZOLAM 1 MG/1
TABLET ORAL
COMMUNITY
End: 2022-10-04

## 2022-10-04 RX ORDER — LEVOTHYROXINE SODIUM 0.03 MG/1
TABLET ORAL
COMMUNITY

## 2022-10-04 RX ORDER — SULFAMETHOXAZOLE AND TRIMETHOPRIM 800; 160 MG/1; MG/1
TABLET ORAL
COMMUNITY
End: 2022-10-04

## 2022-10-04 RX ORDER — ONDANSETRON 4 MG/1
4 TABLET, ORALLY DISINTEGRATING ORAL
COMMUNITY
Start: 2022-09-26 | End: 2022-10-04

## 2022-10-04 RX ORDER — AZITHROMYCIN 250 MG/1
TABLET, FILM COATED ORAL
COMMUNITY
End: 2022-10-04

## 2022-10-04 RX ORDER — PROPRANOLOL HYDROCHLORIDE 20 MG/1
TABLET ORAL
COMMUNITY
End: 2022-10-04

## 2022-10-04 NOTE — OR NURSING
Patient instructed to continue prescribed medications through the day before surgery, verbal and written instructions given to take the following medications the day of surgery per anesthesia protocol:  TYLENOL, ZYRTEC, XALATAN, SYNTHROID, IMODIUM, INDERAL, ZOLOFT         Verbal and written, and pre-admit video website instructions provided via email. Pt states she received this email.     STOP BANG protocol initiated and verbally instructed to bring CPAP DOS.    FALL RISK ordered and protocol initiated.

## 2022-10-05 ENCOUNTER — ANESTHESIA EVENT (OUTPATIENT)
Dept: SURGERY | Facility: MEDICAL CENTER | Age: 77
End: 2022-10-05
Payer: MEDICARE

## 2022-10-05 ENCOUNTER — ANESTHESIA (OUTPATIENT)
Dept: SURGERY | Facility: MEDICAL CENTER | Age: 77
End: 2022-10-05
Payer: MEDICARE

## 2022-10-05 ENCOUNTER — HOSPITAL ENCOUNTER (OUTPATIENT)
Facility: MEDICAL CENTER | Age: 77
End: 2022-10-05
Attending: ORTHOPAEDIC SURGERY | Admitting: ORTHOPAEDIC SURGERY
Payer: MEDICARE

## 2022-10-05 ENCOUNTER — APPOINTMENT (OUTPATIENT)
Dept: RADIOLOGY | Facility: MEDICAL CENTER | Age: 77
End: 2022-10-05
Attending: ORTHOPAEDIC SURGERY
Payer: MEDICARE

## 2022-10-05 VITALS
DIASTOLIC BLOOD PRESSURE: 71 MMHG | RESPIRATION RATE: 14 BRPM | TEMPERATURE: 97.3 F | WEIGHT: 141.09 LBS | HEART RATE: 82 BPM | OXYGEN SATURATION: 93 % | SYSTOLIC BLOOD PRESSURE: 132 MMHG | BODY MASS INDEX: 24.99 KG/M2

## 2022-10-05 DIAGNOSIS — S52.122D CLOSED DISPLACED FRACTURE OF HEAD OF LEFT RADIUS WITH ROUTINE HEALING: ICD-10-CM

## 2022-10-05 LAB
ALBUMIN SERPL BCP-MCNC: 3.9 G/DL (ref 3.2–4.9)
ALBUMIN/GLOB SERPL: 1.2 G/DL
ALP SERPL-CCNC: 147 U/L (ref 30–99)
ALT SERPL-CCNC: 21 U/L (ref 2–50)
ANION GAP SERPL CALC-SCNC: 14 MMOL/L (ref 7–16)
AST SERPL-CCNC: 27 U/L (ref 12–45)
BILIRUB SERPL-MCNC: 0.5 MG/DL (ref 0.1–1.5)
BUN SERPL-MCNC: 22 MG/DL (ref 8–22)
CALCIUM SERPL-MCNC: 9 MG/DL (ref 8.4–10.2)
CHLORIDE SERPL-SCNC: 104 MMOL/L (ref 96–112)
CO2 SERPL-SCNC: 21 MMOL/L (ref 20–33)
CREAT SERPL-MCNC: 0.56 MG/DL (ref 0.5–1.4)
ERYTHROCYTE [DISTWIDTH] IN BLOOD BY AUTOMATED COUNT: 41.3 FL (ref 35.9–50)
GFR SERPLBLD CREATININE-BSD FMLA CKD-EPI: 94 ML/MIN/1.73 M 2
GLOBULIN SER CALC-MCNC: 3.3 G/DL (ref 1.9–3.5)
GLUCOSE SERPL-MCNC: 93 MG/DL (ref 65–99)
HCT VFR BLD AUTO: 41.4 % (ref 37–47)
HGB BLD-MCNC: 14 G/DL (ref 12–16)
MCH RBC QN AUTO: 26.7 PG (ref 27–33)
MCHC RBC AUTO-ENTMCNC: 33.8 G/DL (ref 33.6–35)
MCV RBC AUTO: 78.9 FL (ref 81.4–97.8)
PLATELET # BLD AUTO: 152 K/UL (ref 164–446)
PMV BLD AUTO: 9.5 FL (ref 9–12.9)
POTASSIUM SERPL-SCNC: 4.3 MMOL/L (ref 3.6–5.5)
PROT SERPL-MCNC: 7.2 G/DL (ref 6–8.2)
RBC # BLD AUTO: 5.25 M/UL (ref 4.2–5.4)
SODIUM SERPL-SCNC: 139 MMOL/L (ref 135–145)
WBC # BLD AUTO: 6.5 K/UL (ref 4.8–10.8)

## 2022-10-05 PROCEDURE — 160041 HCHG SURGERY MINUTES - EA ADDL 1 MIN LEVEL 4: Performed by: ORTHOPAEDIC SURGERY

## 2022-10-05 PROCEDURE — 502000 HCHG MISC OR IMPLANTS RC 0278: Performed by: ORTHOPAEDIC SURGERY

## 2022-10-05 PROCEDURE — A9270 NON-COVERED ITEM OR SERVICE: HCPCS | Performed by: ANESTHESIOLOGY

## 2022-10-05 PROCEDURE — 01740 ANES OPN/ARTHRS PX ELBW NOS: CPT | Performed by: ANESTHESIOLOGY

## 2022-10-05 PROCEDURE — C1713 ANCHOR/SCREW BN/BN,TIS/BN: HCPCS | Performed by: ORTHOPAEDIC SURGERY

## 2022-10-05 PROCEDURE — 700111 HCHG RX REV CODE 636 W/ 250 OVERRIDE (IP): Performed by: ANESTHESIOLOGY

## 2022-10-05 PROCEDURE — 160035 HCHG PACU - 1ST 60 MINS PHASE I: Performed by: ORTHOPAEDIC SURGERY

## 2022-10-05 PROCEDURE — 160036 HCHG PACU - EA ADDL 30 MINS PHASE I: Performed by: ORTHOPAEDIC SURGERY

## 2022-10-05 PROCEDURE — 700102 HCHG RX REV CODE 250 W/ 637 OVERRIDE(OP): Performed by: ANESTHESIOLOGY

## 2022-10-05 PROCEDURE — 85027 COMPLETE CBC AUTOMATED: CPT

## 2022-10-05 PROCEDURE — 80053 COMPREHEN METABOLIC PANEL: CPT

## 2022-10-05 PROCEDURE — 93005 ELECTROCARDIOGRAM TRACING: CPT | Performed by: ORTHOPAEDIC SURGERY

## 2022-10-05 PROCEDURE — 700105 HCHG RX REV CODE 258: Performed by: ANESTHESIOLOGY

## 2022-10-05 PROCEDURE — 160029 HCHG SURGERY MINUTES - 1ST 30 MINS LEVEL 4: Performed by: ORTHOPAEDIC SURGERY

## 2022-10-05 PROCEDURE — 160025 RECOVERY II MINUTES (STATS): Performed by: ORTHOPAEDIC SURGERY

## 2022-10-05 PROCEDURE — 160048 HCHG OR STATISTICAL LEVEL 1-5: Performed by: ORTHOPAEDIC SURGERY

## 2022-10-05 PROCEDURE — 160009 HCHG ANES TIME/MIN: Performed by: ORTHOPAEDIC SURGERY

## 2022-10-05 PROCEDURE — 700101 HCHG RX REV CODE 250: Performed by: ORTHOPAEDIC SURGERY

## 2022-10-05 PROCEDURE — 160046 HCHG PACU - 1ST 60 MINS PHASE II: Performed by: ORTHOPAEDIC SURGERY

## 2022-10-05 PROCEDURE — C1776 JOINT DEVICE (IMPLANTABLE): HCPCS | Performed by: ORTHOPAEDIC SURGERY

## 2022-10-05 PROCEDURE — 160002 HCHG RECOVERY MINUTES (STAT): Performed by: ORTHOPAEDIC SURGERY

## 2022-10-05 PROCEDURE — 99100 ANES PT EXTEME AGE<1 YR&>70: CPT | Performed by: ANESTHESIOLOGY

## 2022-10-05 PROCEDURE — 36415 COLL VENOUS BLD VENIPUNCTURE: CPT

## 2022-10-05 PROCEDURE — 700101 HCHG RX REV CODE 250: Performed by: ANESTHESIOLOGY

## 2022-10-05 DEVICE — ANCHOR 2-0 SUTRE QUICKANCHOR PLUS: Type: IMPLANTABLE DEVICE | Site: ELBOW | Status: FUNCTIONAL

## 2022-10-05 DEVICE — IMPLANTABLE DEVICE: Type: IMPLANTABLE DEVICE | Site: ELBOW | Status: FUNCTIONAL

## 2022-10-05 RX ORDER — SODIUM CHLORIDE, SODIUM LACTATE, POTASSIUM CHLORIDE, CALCIUM CHLORIDE 600; 310; 30; 20 MG/100ML; MG/100ML; MG/100ML; MG/100ML
INJECTION, SOLUTION INTRAVENOUS CONTINUOUS
Status: DISCONTINUED | OUTPATIENT
Start: 2022-10-05 | End: 2022-10-05 | Stop reason: HOSPADM

## 2022-10-05 RX ORDER — HYDRALAZINE HYDROCHLORIDE 20 MG/ML
5 INJECTION INTRAMUSCULAR; INTRAVENOUS
Status: DISCONTINUED | OUTPATIENT
Start: 2022-10-05 | End: 2022-10-05 | Stop reason: HOSPADM

## 2022-10-05 RX ORDER — OXYCODONE HCL 5 MG/5 ML
10 SOLUTION, ORAL ORAL
Status: COMPLETED | OUTPATIENT
Start: 2022-10-05 | End: 2022-10-05

## 2022-10-05 RX ORDER — SODIUM CHLORIDE, SODIUM LACTATE, POTASSIUM CHLORIDE, CALCIUM CHLORIDE 600; 310; 30; 20 MG/100ML; MG/100ML; MG/100ML; MG/100ML
INJECTION, SOLUTION INTRAVENOUS
Status: DISCONTINUED | OUTPATIENT
Start: 2022-10-05 | End: 2022-10-05 | Stop reason: SURG

## 2022-10-05 RX ORDER — KETOROLAC TROMETHAMINE 30 MG/ML
INJECTION, SOLUTION INTRAMUSCULAR; INTRAVENOUS PRN
Status: DISCONTINUED | OUTPATIENT
Start: 2022-10-05 | End: 2022-10-05 | Stop reason: SURG

## 2022-10-05 RX ORDER — CEFAZOLIN SODIUM 1 G/3ML
INJECTION, POWDER, FOR SOLUTION INTRAMUSCULAR; INTRAVENOUS PRN
Status: DISCONTINUED | OUTPATIENT
Start: 2022-10-05 | End: 2022-10-05 | Stop reason: SURG

## 2022-10-05 RX ORDER — PHENYLEPHRINE HYDROCHLORIDE 10 MG/ML
INJECTION, SOLUTION INTRAMUSCULAR; INTRAVENOUS; SUBCUTANEOUS PRN
Status: DISCONTINUED | OUTPATIENT
Start: 2022-10-05 | End: 2022-10-05 | Stop reason: SURG

## 2022-10-05 RX ORDER — ONDANSETRON 2 MG/ML
4 INJECTION INTRAMUSCULAR; INTRAVENOUS
Status: DISCONTINUED | OUTPATIENT
Start: 2022-10-05 | End: 2022-10-05 | Stop reason: HOSPADM

## 2022-10-05 RX ORDER — ROCURONIUM BROMIDE 10 MG/ML
INJECTION, SOLUTION INTRAVENOUS PRN
Status: DISCONTINUED | OUTPATIENT
Start: 2022-10-05 | End: 2022-10-05 | Stop reason: SURG

## 2022-10-05 RX ORDER — ONDANSETRON 2 MG/ML
INJECTION INTRAMUSCULAR; INTRAVENOUS PRN
Status: DISCONTINUED | OUTPATIENT
Start: 2022-10-05 | End: 2022-10-05 | Stop reason: SURG

## 2022-10-05 RX ORDER — DEXAMETHASONE SODIUM PHOSPHATE 4 MG/ML
INJECTION, SOLUTION INTRA-ARTICULAR; INTRALESIONAL; INTRAMUSCULAR; INTRAVENOUS; SOFT TISSUE PRN
Status: DISCONTINUED | OUTPATIENT
Start: 2022-10-05 | End: 2022-10-05 | Stop reason: SURG

## 2022-10-05 RX ORDER — HYDROMORPHONE HYDROCHLORIDE 1 MG/ML
0.4 INJECTION, SOLUTION INTRAMUSCULAR; INTRAVENOUS; SUBCUTANEOUS
Status: DISCONTINUED | OUTPATIENT
Start: 2022-10-05 | End: 2022-10-05 | Stop reason: HOSPADM

## 2022-10-05 RX ORDER — HYDROMORPHONE HYDROCHLORIDE 1 MG/ML
0.1 INJECTION, SOLUTION INTRAMUSCULAR; INTRAVENOUS; SUBCUTANEOUS
Status: DISCONTINUED | OUTPATIENT
Start: 2022-10-05 | End: 2022-10-05 | Stop reason: HOSPADM

## 2022-10-05 RX ORDER — SODIUM CHLORIDE 9 MG/ML
500 INJECTION, SOLUTION INTRAVENOUS
Status: DISCONTINUED | OUTPATIENT
Start: 2022-10-05 | End: 2022-10-05 | Stop reason: HOSPADM

## 2022-10-05 RX ORDER — DIPHENHYDRAMINE HYDROCHLORIDE 50 MG/ML
12.5 INJECTION INTRAMUSCULAR; INTRAVENOUS
Status: DISCONTINUED | OUTPATIENT
Start: 2022-10-05 | End: 2022-10-05 | Stop reason: HOSPADM

## 2022-10-05 RX ORDER — METOPROLOL TARTRATE 1 MG/ML
1 INJECTION, SOLUTION INTRAVENOUS
Status: DISCONTINUED | OUTPATIENT
Start: 2022-10-05 | End: 2022-10-05 | Stop reason: HOSPADM

## 2022-10-05 RX ORDER — SODIUM CHLORIDE, SODIUM LACTATE, POTASSIUM CHLORIDE, CALCIUM CHLORIDE 600; 310; 30; 20 MG/100ML; MG/100ML; MG/100ML; MG/100ML
INJECTION, SOLUTION INTRAVENOUS CONTINUOUS
Status: ACTIVE | OUTPATIENT
Start: 2022-10-05 | End: 2022-10-05

## 2022-10-05 RX ORDER — BUPIVACAINE HYDROCHLORIDE AND EPINEPHRINE 5; 5 MG/ML; UG/ML
INJECTION, SOLUTION EPIDURAL; INTRACAUDAL; PERINEURAL
Status: DISCONTINUED | OUTPATIENT
Start: 2022-10-05 | End: 2022-10-05 | Stop reason: HOSPADM

## 2022-10-05 RX ORDER — HYDROMORPHONE HYDROCHLORIDE 1 MG/ML
0.2 INJECTION, SOLUTION INTRAMUSCULAR; INTRAVENOUS; SUBCUTANEOUS
Status: DISCONTINUED | OUTPATIENT
Start: 2022-10-05 | End: 2022-10-05 | Stop reason: HOSPADM

## 2022-10-05 RX ORDER — ALBUTEROL SULFATE 2.5 MG/3ML
2.5 SOLUTION RESPIRATORY (INHALATION)
Status: DISCONTINUED | OUTPATIENT
Start: 2022-10-05 | End: 2022-10-05 | Stop reason: HOSPADM

## 2022-10-05 RX ORDER — HALOPERIDOL 5 MG/ML
1 INJECTION INTRAMUSCULAR
Status: DISCONTINUED | OUTPATIENT
Start: 2022-10-05 | End: 2022-10-05 | Stop reason: HOSPADM

## 2022-10-05 RX ORDER — LIDOCAINE HYDROCHLORIDE 20 MG/ML
INJECTION, SOLUTION EPIDURAL; INFILTRATION; INTRACAUDAL; PERINEURAL PRN
Status: DISCONTINUED | OUTPATIENT
Start: 2022-10-05 | End: 2022-10-05 | Stop reason: SURG

## 2022-10-05 RX ORDER — OXYCODONE HCL 5 MG/5 ML
5 SOLUTION, ORAL ORAL
Status: COMPLETED | OUTPATIENT
Start: 2022-10-05 | End: 2022-10-05

## 2022-10-05 RX ORDER — LABETALOL HYDROCHLORIDE 5 MG/ML
5 INJECTION, SOLUTION INTRAVENOUS
Status: DISCONTINUED | OUTPATIENT
Start: 2022-10-05 | End: 2022-10-05 | Stop reason: HOSPADM

## 2022-10-05 RX ADMIN — HYDROMORPHONE HYDROCHLORIDE 0.4 MG: 1 INJECTION, SOLUTION INTRAMUSCULAR; INTRAVENOUS; SUBCUTANEOUS at 15:44

## 2022-10-05 RX ADMIN — FENTANYL CITRATE 25 MCG: 50 INJECTION, SOLUTION INTRAMUSCULAR; INTRAVENOUS at 15:26

## 2022-10-05 RX ADMIN — HYDROMORPHONE HYDROCHLORIDE 0.4 MG: 1 INJECTION, SOLUTION INTRAMUSCULAR; INTRAVENOUS; SUBCUTANEOUS at 15:34

## 2022-10-05 RX ADMIN — SODIUM CHLORIDE, POTASSIUM CHLORIDE, SODIUM LACTATE AND CALCIUM CHLORIDE: 600; 310; 30; 20 INJECTION, SOLUTION INTRAVENOUS at 14:15

## 2022-10-05 RX ADMIN — HYDROMORPHONE HYDROCHLORIDE 0.4 MG: 1 INJECTION, SOLUTION INTRAMUSCULAR; INTRAVENOUS; SUBCUTANEOUS at 15:29

## 2022-10-05 RX ADMIN — SUGAMMADEX 200 MG: 100 INJECTION, SOLUTION INTRAVENOUS at 15:05

## 2022-10-05 RX ADMIN — DEXAMETHASONE SODIUM PHOSPHATE 8 MG: 4 INJECTION, SOLUTION INTRA-ARTICULAR; INTRALESIONAL; INTRAMUSCULAR; INTRAVENOUS; SOFT TISSUE at 14:23

## 2022-10-05 RX ADMIN — PROPOFOL 30 MG: 10 INJECTION, EMULSION INTRAVENOUS at 15:09

## 2022-10-05 RX ADMIN — LIDOCAINE HYDROCHLORIDE 40 MG: 20 INJECTION, SOLUTION EPIDURAL; INFILTRATION; INTRACAUDAL at 14:20

## 2022-10-05 RX ADMIN — FENTANYL CITRATE 25 MCG: 50 INJECTION, SOLUTION INTRAMUSCULAR; INTRAVENOUS at 15:09

## 2022-10-05 RX ADMIN — FENTANYL CITRATE 25 MCG: 50 INJECTION, SOLUTION INTRAMUSCULAR; INTRAVENOUS at 15:40

## 2022-10-05 RX ADMIN — FENTANYL CITRATE 25 MCG: 50 INJECTION, SOLUTION INTRAMUSCULAR; INTRAVENOUS at 15:31

## 2022-10-05 RX ADMIN — PROPOFOL 150 MG: 10 INJECTION, EMULSION INTRAVENOUS at 14:21

## 2022-10-05 RX ADMIN — GLYCOPYRROLATE 0.2 MG: 0.2 INJECTION INTRAMUSCULAR; INTRAVENOUS at 14:34

## 2022-10-05 RX ADMIN — FENTANYL CITRATE 50 MCG: 50 INJECTION, SOLUTION INTRAMUSCULAR; INTRAVENOUS at 14:21

## 2022-10-05 RX ADMIN — EPHEDRINE SULFATE 5 MG: 50 INJECTION INTRAMUSCULAR; INTRAVENOUS; SUBCUTANEOUS at 14:34

## 2022-10-05 RX ADMIN — ROCURONIUM BROMIDE 50 MG: 10 INJECTION, SOLUTION INTRAVENOUS at 14:21

## 2022-10-05 RX ADMIN — ONDANSETRON 4 MG: 2 INJECTION INTRAMUSCULAR; INTRAVENOUS at 15:05

## 2022-10-05 RX ADMIN — OXYCODONE HYDROCHLORIDE 10 MG: 5 SOLUTION ORAL at 15:25

## 2022-10-05 RX ADMIN — FENTANYL CITRATE 25 MCG: 50 INJECTION, SOLUTION INTRAMUSCULAR; INTRAVENOUS at 15:38

## 2022-10-05 RX ADMIN — PHENYLEPHRINE HYDROCHLORIDE 100 MCG: 10 INJECTION INTRAVENOUS at 14:20

## 2022-10-05 RX ADMIN — CEFAZOLIN 2 G: 330 INJECTION, POWDER, FOR SOLUTION INTRAMUSCULAR; INTRAVENOUS at 14:25

## 2022-10-05 RX ADMIN — KETOROLAC TROMETHAMINE 15 MG: 30 INJECTION, SOLUTION INTRAMUSCULAR at 15:05

## 2022-10-05 RX ADMIN — DIPHENHYDRAMINE HYDROCHLORIDE 12.5 MG: 50 INJECTION INTRAMUSCULAR; INTRAVENOUS at 15:26

## 2022-10-05 RX ADMIN — PROPOFOL 50 MG: 10 INJECTION, EMULSION INTRAVENOUS at 14:40

## 2022-10-05 ASSESSMENT — PAIN DESCRIPTION - PAIN TYPE
TYPE: ACUTE PAIN
TYPE: SURGICAL PAIN

## 2022-10-05 NOTE — OR NURSING
1515  Patient to PACU from OR after report received      1520 oral airway removed Patient having a lot of pain she is moaniing, grimacing, restless and crying.      1526 12.5 mg benadryl for nausea   10 mg or Roxicodone   25 mcgs of fentanyl     1529 0.4 mg of dilaudid     1531 Repeated dose of 25 mcgs of fentanyl given     1534 Repeated dose of 0.4 mg dilaudid     1538 repeated 25 mcgs of fentanyl given continues to struggle with pain. moaniing, grimacing, and restless       1540 repeated 25 mcgs of fentanyl     1544 repeated 0.4 mg dilaudid      1546 patient states she is feeling some ease of pain

## 2022-10-05 NOTE — ANESTHESIA POSTPROCEDURE EVALUATION
Patient: Roseline Redding    Procedure Summary     Date: 10/05/22 Room / Location:  OR 02 / SURGERY [facility]    Anesthesia Start: 1413 Anesthesia Stop: 1518    Procedure: LEFT RADIAL HEAD ARTHROPLASTY (Left: Elbow) Diagnosis: (CLOSED FRACTURE OF HEAD OF RADIUS)    Surgeons: Dequan Huston M.D. Responsible Provider: Marian Renee M.D.    Anesthesia Type: general ASA Status: 3          Final Anesthesia Type: general  Last vitals  BP   Blood Pressure : (!) 160/76    Temp   36.6 °C (97.9 °F)    Pulse   74   Resp   18    SpO2   96 %      Anesthesia Post Evaluation    Patient location during evaluation: PACU  Patient participation: complete - patient participated  Level of consciousness: awake and alert    Airway patency: patent  Anesthetic complications: no  Cardiovascular status: hemodynamically stable  Respiratory status: acceptable  Hydration status: euvolemic    PONV: none          No notable events documented.     Nurse Pain Score: 4 (NPRS)

## 2022-10-05 NOTE — OP REPORT
Date of surgery: 10/5/2022    Preoperative diagnosis: Comminuted and displaced left intra-articular radial head fracture    Postoperative diagnosis:   1-comminuted and displaced left intra-articular radial head fracture  2-disruption of the lateral ulnar collateral ligament with ulnohumeral laxity    Surgery performed:  1-left radial head arthroplasty  2-left lateral ulnar collateral ligament direct repair    Surgeon: Dequan Huston MD    Anesthesia: General    Complications: None    Estimated blood loss: 10 mL    Tourniquet time: 42 minutes    Tourniquet pressure: 250 mmHg    Indication for procedure: Roseline is a very pleasant lady  who sustained a left comminuted intra-articular radial head fracture.  She was initially being treated nonoperatively, however, she had a subsequent fall with displacement of the radial head fragments.  For these reasons the decision was made to take the operating today for the above-mentioned procedures.    Description of procedure: On the date of surgery Roseline was seen in the preoperative area where informed consent was obtained with all risks and benefits of the procedure explained and all questions answered.  She wished to proceed with the surgery.  Proper site was marked.  She was subsequently taken the operating room and placed in the supine position with all bony promises well-padded.  General endotracheal anesthesia was used.  A tourniquet was placed on the left upper extremities then prepped and draped in usual sterile fashion.    A timeout was performed with all persons in attendance agreed on the proper patient, proper surgical site and proper surgery be performed.    An Esmarch was used to exsanguinate the left upper extremity the tourniquet was insufflated to 250 mmHg.  A longitudinal incision made in line with the radial head and lateral epicondyle.  This was done for standard Kocher approach to the radial head.  Sharp and blunt dissection taken down through  subcutaneous tissues.  Bleeding is well controlled using Bovie electrocautery.  I was then able to visualize the extensor mechanism fascia which was then incised and I exploited the Kocher interval to isolate and visualize the radial head.  The fragments were removed from the joint.  These were severely comminuted.  She also had some evidence of grade III chondromalacia of the capitellum without full-thickness cartilage loss.  The wound was irrigated with normal saline.  I then used Homans to place and around the radial neck.  I then performed a radial neck cut.  I then broached for appropriate size skeletal dynamics align radial head replacement.  This was found to be a size 8 stem.  I then placed a trial stem and head and found that a size 20 mm head was appropriate.  The trial implant was reduced and visualized on fluoroscopy.  This was when the ulnohumeral joint was noted to be unstable and taken through a full range of motion without gross instability.  The lateral ulnar collateral ligament was examined and found to be disrupted traumatically.    The wound was then thoroughly irrigated copious amounts normal saline.  I then placed the final implant and the joint was reduced.  Fluoroscopy was again used to verify acceptable positioning and alignment.  I then placed a Mytek suture anchor into the lateral epicondyle and performed a direct repair of the lateral ulnar collateral ligament.  Final images were obtained.    The wound was again thoroughly irrigated copious amounts normal saline.  The fascia was repaired using 2-0 Vicryl in a running fashion.  Subcutaneous tissues were repaired using 3-0 Monocryl.  The skin was then repaired using a Zipline wound closure device.  The wound was then dressed with Xeroform, 4 x 4's, sterile cast padding and a well-padded long-arm splint was then placed.  The tourniquet was deflated.  General endotracheal anesthesia was reversed and she was taken the PACU in good stable  condition.    Disposition: She will be discharged home on a regular diet.  She will have a 1 pound lifting restriction the left upper extremity.  She should apply ice and elevate as much possible for the next several days.  The splint should remain clean, dry and intact until she returns to clinic in 10 to 14 days.  She was prescribed narcotic pain medication and instructed not to drive or operate machinery while taking this medication.

## 2022-10-05 NOTE — OR NURSING
Received patient from [facility].  Patient prepped for surgery.  All questions answered.  Patient waiting comfortably with call light in reach. Patient forgot cpap machine.  Daughter will retrieve while patient is in surgery and return with it for post-operative recovery if needed.

## 2022-10-05 NOTE — ANESTHESIA PREPROCEDURE EVALUATION
Case: [id number] Date/Time: 10/05/22 1330    Procedure: LEFT RADIAL HEAD ARTHROPLASTY (Left)    Pre-op diagnosis: CLOSED FRACTURE OF HEAD OF RADIUS    Location:  OR 02 / SURGERY [facility]    Surgeons: Dequan Huston M.D.          Relevant Problems   ANESTHESIA   (positive) CLAUDIA (obstructive sleep apnea)       Physical Exam    Airway   Mallampati: II  TM distance: >3 FB  Neck ROM: full       Cardiovascular - normal exam  Rhythm: regular  Rate: normal  (-) murmur     Dental - normal exam           Pulmonary - normal exam  Breath sounds clear to auscultation     Abdominal    Neurological - normal exam                 Anesthesia Plan    ASA 3   ASA physical status 3 criteria: other (comment) and hypertension - poorly controlled    Plan - general       Airway plan will be LMA        Plan Factors:   Patient was previously instructed to abstain from smoking on day of procedure.  Patient did not smoke on day of procedure.      Induction: intravenous    Postoperative Plan: Postoperative administration of opioids is intended.    Pertinent diagnostic labs and testing reviewed    Informed Consent:    Anesthetic plan and risks discussed with patient.    Use of blood products discussed with: patient whom consented to blood products.

## 2022-10-05 NOTE — OR NURSING
1601: Rcvd report from LORENZO Atkins and assumed care. Pt resting comfortably in the gurney, pain is more tolerable, no nausea, awake and alert, tolerating sips of water. Dressings CDI, 2+ radial pulse.  1615: Tolerating decrease of supplemental O2, pain is tolerable, no nausea.   1630: Pain has decreased and is tolerable, no nausea, tolerating sips of juice. Also tolerating decrease of supplemental O2. Dressing remains CDI.  1645: Pain remains tolerable, no nausea, awake and alert. Tried pt on room air, but she dropped below 90%. Will obtain an IS and have her use it. Sat her up more in the [place] so she can get a deeper breath.   1700: Pain is tolerable, no nausea, awake and alert, O2 saturations still falling below 90% on room air, decrease to 86% and then return to 94-95% when pt breaths. Pt encouraged to continue to take normal breaths. She does use a CPAP unit, but forgot to bring it with her to the hospital. Pt told that she needs to use her CPAP unit while sleeping for the next week. She says she does use it every night now that she got it back from the recall.  1715: Pain is again decreased, no nausea, awake and alert, but still having some O2 issues. Working with the IS and encouraging deep breathes.   1730: Changed to ear prob to monitor O2 saturations and they seem to be reading better, pt does have gel tips on her fingernails. Pain remains tolerable, updated pt's family.   1745: Pain remains low and tolerable, no nausea, O2 saturations will fall below 90% on room air. Pt needs to use RR, plan is to get her up and dressed and see what O2 saturations will do. Report called to LORENZO Davis and pt transferred to Stage 2. Dressing remains CDI. 2+ radial pulse.

## 2022-10-05 NOTE — ANESTHESIA TIME REPORT
Anesthesia Start and Stop Event Times     Date Time Event    10/5/2022 1342 Ready for Procedure     1413 Anesthesia Start     1518 Anesthesia Stop        Responsible Staff  10/05/22    Name Role Begin End    Marian Renee M.D. Anesth 1413 1518        Overtime Reason:  overtime    Comments:

## 2022-10-05 NOTE — ANESTHESIA PROCEDURE NOTES
Airway    Date/Time: 10/5/2022 2:21 PM  Performed by: Marian Renee M.D.  Authorized by: Marian Renee M.D.     Location:  OR  Urgency:  Elective  Indications for Airway Management:  Anesthesia      Spontaneous Ventilation: absent    Sedation Level:  Deep  Preoxygenated: Yes    Mask Difficulty Assessment:  0 - not attempted  Final Airway Type:  Supraglottic airway  Final Supraglottic Airway:  Standard LMA    SGA Size:  4  Number of Attempts at Approach:  1

## 2022-10-06 LAB — EKG IMPRESSION: NORMAL

## 2022-10-06 PROCEDURE — 93010 ELECTROCARDIOGRAM REPORT: CPT | Performed by: INTERNAL MEDICINE

## 2022-10-06 NOTE — OR NURSING
1749: Patient arrived to phase II from PACU 1 via gurney. Report received from RN. Respirations are spontaneous and unlabored. Dressing is CDI. VSS on RA.     1805: Family at bedside.     1820: Patient education completed, family denies further questions. DC'd to care of family post uneventful stay in PACU 2.

## 2022-11-11 ENCOUNTER — PATIENT MESSAGE (OUTPATIENT)
Dept: HEALTH INFORMATION MANAGEMENT | Facility: OTHER | Age: 77
End: 2022-11-11

## 2022-12-08 ENCOUNTER — OFFICE VISIT (OUTPATIENT)
Dept: SLEEP MEDICINE | Facility: MEDICAL CENTER | Age: 77
End: 2022-12-08
Payer: MEDICARE

## 2022-12-08 VITALS
HEART RATE: 77 BPM | SYSTOLIC BLOOD PRESSURE: 132 MMHG | WEIGHT: 145 LBS | OXYGEN SATURATION: 95 % | DIASTOLIC BLOOD PRESSURE: 70 MMHG | HEIGHT: 63 IN | RESPIRATION RATE: 14 BRPM | BODY MASS INDEX: 25.69 KG/M2

## 2022-12-08 DIAGNOSIS — G47.33 OSA (OBSTRUCTIVE SLEEP APNEA): Primary | ICD-10-CM

## 2022-12-08 PROCEDURE — 99213 OFFICE O/P EST LOW 20 MIN: CPT | Performed by: STUDENT IN AN ORGANIZED HEALTH CARE EDUCATION/TRAINING PROGRAM

## 2022-12-08 ASSESSMENT — FIBROSIS 4 INDEX: FIB4 SCORE: 2.98

## 2022-12-08 NOTE — PROGRESS NOTES
"[facility] Follow-up Visit    CC: Follow-up regarding management of obstructive sleep apnea      HPI:  Roseline Redding is a 77 y.o.female  with MJ, hypothyroidism, GERD, bruxism, allergic rhinitis, and obstructive sleep apnea on CPAP.  Presents today to follow-up regarding management of obstructive sleep apnea.    Since last visit she has been using her CPAP regularly.  States she did receive new tubing which she prefers to her previous tubing.  She is no longer having trouble with her tubing coming off.  She does find that the water chamber empties on some nights and not other nights.  She has not been able to correlate it with anything.  Overall she feels that her CPAP is working.      DME provider: [employer]   Device: [employer] 2  Mask: nasal pillows   Aerophagia: No   Snoring: No   Dry mouth: No   Leak: No   Skin irritation: No   Chin strap: No     Patient Active Problem List    Diagnosis Date Noted    Closed displaced fracture of head of left radius with routine healing 10/05/2022    CLAUDIA (obstructive sleep apnea) 05/09/2019    Malignant neoplasm of other specified sites of female breast 10/19/2012       Past Medical History:   Diagnosis Date    Allergic rhinitis     Arrhythmia     MVP    Arthritis     knees/rt thumb    Breast cancer (HCC)     Bronchitis 2019    Bruxism     Cancer (HCC) 2012    breast, left    CATARACT     Bilateral IOL    Chickenpox     GERD (gastroesophageal reflux disease)     [place] measles     Glaucoma     Bilateral    Heart valve disease     MVP    Hemorrhagic disorder (HCC)     \"Low platelets\"    Hypothyroidism     Influenza     Mumps     Pain 10/04/2022    3-4/10    Restless leg syndrome     Sleep apnea     Snoring     TMJ (dislocation of temporomandibular joint)     Tonsillitis     Unspecified disorder of thyroid     Unspecified urinary incontinence     sneezing/coughing    Whooping cough     Whooping cough 1950        Past Surgical History:   Procedure Laterality Date "    PB RECONSTRUCT RADIAL HEAD W IMPLANT Left 10/5/2022    Procedure: LEFT RADIAL HEAD ARTHROPLASTY;  Surgeon: Dequan Huston M.D.;  Location: SURGERY [facility];  Service: Orthopedics    WI COLONOSCOPY,DIAGNOSTIC  8/18/2021    Procedure: COLONOSCOPY  - WITH BIOPSIES and SNARE POLYPECTOMY;  Surgeon: Shravan Todd M.D.;  Location: [facility];  Service: EUS    WI COLONOSCOPY W/ENDOSCOPE US  8/18/2021    Procedure: COLONOSCOPY, FLEXIBLE, WITH ENDOSCOPIC US - OF ANAL SPHINCTER;  Surgeon: Shravan Todd M.D.;  Location: [facility];  Service: EUS    PB TOTAL KNEE ARTHROPLASTY Right 6/7/2021    Procedure: RIGHT TOTAL KNEE ARTHROPLASTY;  Surgeon: Andrade Kwok M.D.;  Location: [facility] [place];  Service: Orthopedics    KNEE ARTHROPLASTY TOTAL Right 06/2021    KNEE ARTHROPLASTY TOTAL Left 2019    STEREOTACTIC BIOPSY Left 01/26/2017    LUMPECTOMY  10/19/2012    Performed by Efrain Eagle M.D. at SURGERY [facility] ORS    NODE BIOPSY SENTINEL  10/19/2012    Performed by Efrain Eagle M.D. at SURGERY [facility] ORS    FINGER ARTHROPLASTY  1/15/2010    Performed [facility] EFRAIN WILKINS [facility]    TENDON TRANSFER  1/15/2010    Performed by EFRAIN WILKINS at [facility]ERY [facility]    OTHER  2006    face lift    OTHER  2003    sinus     ARTHROSCOPY, KNEE      OTHER ORTHOPEDIC SURGERY      rt and lft meniscus    OTHER ORTHOPEDIC SURGERY      lft toes    WI REMV 2ND CATARACT,CORN-SCLER SECTN      SINUSCOPE      TONSILLECTOMY      US-NEEDLE CORE BX-BREAST PANEL         Family History   Problem Relation Age of Onset    Lung Cancer Father     Hypertension Brother     No Known Problems Daughter     Sleep Apnea Neg Hx        Social History     Socioeconomic History    Marital status:      Spouse name: Not on file    Number of children: Not on file    Years of education: Not on file    Highest education level: Not on file   Occupational  "History    Not on file   Tobacco Use    Smoking status: Former     Packs/day: 1.50     Years: 15.00     Pack years: 22.50     Types: Cigarettes     Quit date: 1983     Years since quittin.8    Smokeless tobacco: Never    Tobacco comments:     1.5 pks  a day for a total of 15 yrs   Vaping Use    Vaping Use: Never used   Substance and Sexual Activity    Alcohol use: Not Currently     Comment: \"Not since \"    Drug use: Not Currently     Comment: \"CBD for tremors\"    Sexual activity: Not on file   Other Topics Concern    Not on file   Social History Narrative    Not on file     Social Determinants of Health     Financial Resource Strain: Not on file   Food Insecurity: Not on file   Transportation Needs: Not on file   Physical Activity: Not on file   Stress: Not on file   Social Connections: Not on file   Intimate Partner Violence: Not on file   Housing Stability: Not on file       Current Outpatient Medications   Medication Sig Dispense Refill    amoxicillin (AMOXIL) 500 MG Cap amoxicillin 500 mg capsule      Cetirizine HCl (ZYRTEC ALLERGY) 10 MG Cap Take 10 mg by mouth every day.      levothyroxine (SYNTHROID) 25 MCG Tab Synthroid 25 mcg tablet   Take 2 tablets every day by oral route.      propranolol (INDERAL) 10 MG Tab 10 mg 2 times a day. Pt taking PRN      loperamide (IMODIUM) 1 MG/7.5ML Suspension Take 1 mg by mouth 4 times a day as needed for Diarrhea. Taking once a day.      Acetaminophen (TYLENOL 8 HOUR PO) Take 1,000 mg by mouth 4 times a day as needed.      alendronate (FOSAMAX) 70 MG Tab Take 70 mg by mouth every 7 days.      EPINEPHrine (EPIPEN) 0.3 MG/0.3ML Solution Auto-injector solution for injection       latanoprost (XALATAN) 0.005 % Solution Administer 1 Drop into the right eye every day.      anastrozole (ARIMIDEX) 1 MG Tab Take 1 Tablet by mouth at bedtime.      sertraline (ZOLOFT) 100 MG Tab Take 1 Tab by mouth every day.       No current facility-administered medications for this " visit.        ALLERGIES: Codeine    ROS  Constitutional: Denies fevers, Denies weight changes  Ears/Nose/Throat/Mouth: Denies nasal congestion or sore throat   Cardiovascular: Denies chest pain  Respiratory: Denies shortness of breath, Denies cough  Gastrointestinal/Hepatic: Denies nausea, vomiting  Sleep: see HPI      PHYSICAL EXAM  There were no vitals taken for this visit.  Appearance: Well-nourished, well-developed, no acute distress  Eyes:  No scleral icterus , EOMI  ENMT: masked  Musculoskeletal:  Grossly normal; gait and station normal; digits and nails normal  Skin:  No rashes, petechiae, cyanosis  Neurologic: without focal signs; oriented to person, time, place, and purpose; judgement intact      Medical Decision Making   Assessment and Plan  Roseline Redding is a 77 y.o.female  with MJ, hypothyroidism, GERD, bruxism, allergic rhinitis, and obstructive sleep apnea on CPAP.  Presents today to follow-up regarding management of obstructive sleep apnea.    The medical record was reviewed.    Obstructive sleep apnea    Compliance data reviewed showing 60% usage > 4hours in last 30  days. Average AHI 9.2 events/hour.  Fixed pressure 8 cmH2O. Pt continues to use and benefit from machine.     April importance of usage.  AHI predominantly made up of hypopneas accounting for 5.3 of AHI.  Due to this will increase pressure.  Change mode to auto CPAP 8 to 10 cm water.      PLAN:   -Order placed for mask and supplies   -Order placed to change CPAP mode to auto 8-10 cmH2O  -Advised to reach out via MyChart with questions     Has been advised to continue the current CPAP, clean equipment frequently, and get new mask and supplies as allowed by insurance and DME. Recommend an earlier appointment, if significant treatment barriers develop.    Patients with CLAUDIA are at increased risk of cardiovascular disease including coronary artery disease, systemic arterial hypertension, pulmonary arterial hypertension, cardiac  arrythmias, and stroke. The patient was advised to avoid driving a motor vehicle when drowsy.    Positive airway pressure will favorably impact many of the adverse conditions and effects provoked by CLAUDIA.    Have advised the patient to follow up with the appropriate healthcare practitioners for all other medical problems and issues.    No follow-ups on file.      Please note portions of this record was created using voice recognition software. I have made every reasonable attempt to correct obvious errors, but I expect that there are errors of grammar and possibly content I did not discover before finalizing the note.

## 2023-03-08 ENCOUNTER — APPOINTMENT (OUTPATIENT)
Dept: SLEEP MEDICINE | Facility: MEDICAL CENTER | Age: 78
End: 2023-03-08
Payer: MEDICARE

## 2023-03-10 ENCOUNTER — APPOINTMENT (OUTPATIENT)
Dept: RADIOLOGY | Facility: MEDICAL CENTER | Age: 78
End: 2023-03-10
Attending: FAMILY MEDICINE
Payer: MEDICARE

## 2023-03-10 DIAGNOSIS — Z12.31 VISIT FOR SCREENING MAMMOGRAM: ICD-10-CM

## 2023-03-16 ENCOUNTER — HOSPITAL ENCOUNTER (OUTPATIENT)
Dept: RADIOLOGY | Facility: MEDICAL CENTER | Age: 78
End: 2023-03-16
Attending: FAMILY MEDICINE
Payer: MEDICARE

## 2023-03-16 DIAGNOSIS — Z12.31 VISIT FOR SCREENING MAMMOGRAM: ICD-10-CM

## 2023-03-16 PROCEDURE — 77063 BREAST TOMOSYNTHESIS BI: CPT

## 2023-04-03 ENCOUNTER — TELEPHONE (OUTPATIENT)
Dept: SLEEP MEDICINE | Facility: MEDICAL CENTER | Age: 78
End: 2023-04-03

## 2023-04-03 NOTE — TELEPHONE ENCOUNTER
Patient did call stated CPAP Pressure is too high, would like it to be lowered. Did check to see if patient is not wireless.

## 2023-04-25 ENCOUNTER — OFFICE VISIT (OUTPATIENT)
Dept: SLEEP MEDICINE | Facility: MEDICAL CENTER | Age: 78
End: 2023-04-25
Attending: NURSE PRACTITIONER
Payer: MEDICARE

## 2023-04-25 VITALS
RESPIRATION RATE: 16 BRPM | HEART RATE: 75 BPM | HEIGHT: 63 IN | OXYGEN SATURATION: 96 % | SYSTOLIC BLOOD PRESSURE: 118 MMHG | WEIGHT: 146 LBS | BODY MASS INDEX: 25.87 KG/M2 | DIASTOLIC BLOOD PRESSURE: 60 MMHG

## 2023-04-25 DIAGNOSIS — G47.33 OSA (OBSTRUCTIVE SLEEP APNEA): ICD-10-CM

## 2023-04-25 PROCEDURE — 99212 OFFICE O/P EST SF 10 MIN: CPT | Performed by: NURSE PRACTITIONER

## 2023-04-25 PROCEDURE — 99213 OFFICE O/P EST LOW 20 MIN: CPT | Performed by: NURSE PRACTITIONER

## 2023-04-25 ASSESSMENT — PATIENT HEALTH QUESTIONNAIRE - PHQ9: CLINICAL INTERPRETATION OF PHQ2 SCORE: 0

## 2023-04-25 ASSESSMENT — FIBROSIS 4 INDEX: FIB4 SCORE: 3.02

## 2023-04-25 NOTE — PROGRESS NOTES
Chief Complaint   Patient presents with    Apnea       HPI:  Roseline Redding is a 78 y.o. year old female here today for follow-up on CLAUDIA.  Last seen 12/8/2022 by Dr. Del Rio.  Past medical history includes TMJ, hypothyroidism, GERD, bruxism, allergic rhinitis, and obstructive sleep apnea on CPAP.    At previous visit, patient was only using her CPAP for 60% of the past 30 days.  Her AHI was 9.2 consisting mostly of hypopneas accounting for 5.3 of AHI.  Pressure was increased to auto CPAP 8 to 10 cm.  She was advised to use CPAP and return for recheck.    She is currently on a [employer] 2 that she received through the recall program.  DME company is [employer].  She currently uses nasal pillows.  Denies any difficulty with mask fit or pressures.  The pressures were changed at last visit.  She denies any difficulty tolerating pressures.  Since the change though she is currently using heated tubing along with humidification.  She states that water was getting into the tubing causing increase in sinus congestion and cough.  She states that her use because of this was sporadic.    30-day compliance reviewed with patient does show 63.3% use with an average time of 2 hours and 13 minutes and a resultant AHI of 6.3.  There is no evidence of excessive mask leak.  Tube temperature is currently at 3 along with humidification set to 3.  Will turn humidification off for now.    Sleep History  5/10/2017 PSG split-night study showed severe obstructive sleep apnea overall AHI 50, minimum oxygen saturation 83%, responded well to CPAP therapy.  Recommended pressure of 6 cm water.    ROS: As per HPI and otherwise negative if not stated.    Past Medical History:   Diagnosis Date    Allergic rhinitis     Arrhythmia     MVP    Arthritis     knees/rt thumb    Breast cancer (HCC)     Bronchitis 2019    Bruxism     Cancer (HCC) 2012    breast, left    CATARACT     Bilateral IOL    Chickenpox     GERD (gastroesophageal reflux  "disease)     [place] measles     Glaucoma     Bilateral    Heart valve disease     MVP    Hemorrhagic disorder (HCC)     \"Low platelets\"    Hypothyroidism     Influenza     Mumps     Pain 10/04/2022    3-4/10    Restless leg syndrome     Sleep apnea     Snoring     TMJ (dislocation of temporomandibular joint)     Tonsillitis     Unspecified disorder of thyroid     Unspecified urinary incontinence     sneezing/coughing    Whooping cough     Whooping cough 1950       Past Surgical History:   Procedure Laterality Date    PB RECONSTRUCT RADIAL HEAD W IMPLANT Left 10/5/2022    Procedure: LEFT RADIAL HEAD ARTHROPLASTY;  Surgeon: Dequan Huston M.D.;  Location: [facility];  Service: Orthopedics    SD COLONOSCOPY,DIAGNOSTIC  8/18/2021    Procedure: COLONOSCOPY  - WITH BIOPSIES and SNARE POLYPECTOMY;  Surgeon: Shravan Todd M.D.;  Location: [facility];  Service: EUS    SD COLONOSCOPY W/ENDOSCOPE US  8/18/2021    Procedure: COLONOSCOPY, FLEXIBLE, WITH ENDOSCOPIC US - OF ANAL SPHINCTER;  Surgeon: Shravan Todd M.D.;  Location: SURGERY [facility];  Service: EUS    PB TOTAL KNEE ARTHROPLASTY Right 6/7/2021    Procedure: RIGHT TOTAL KNEE ARTHROPLASTY;  Surgeon: Andrade Kwok M.D.;  Location: [facility] [place];  Service: Orthopedics    KNEE ARTHROPLASTY TOTAL Right 06/2021    KNEE ARTHROPLASTY TOTAL Left 2019    STEREOTACTIC BIOPSY Left 01/26/2017    LUMPECTOMY  10/19/2012    Performed by Efrain Eagle M.D. at SURGERY [facility] ORS    NODE BIOPSY SENTINEL  10/19/2012    Performed by Efrain Eagle M.D. at SURGERY [facility] ORS    FINGER ARTHROPLASTY  1/15/2010    Performed by EFRAIN WILKINS at SURGERY [facility]S    TENDON TRANSFER  1/15/2010    [facility]IN [facility]ILKINS [facility]    OTHER  2006    face lift    OTHER  2003    sinus     ARTHROSCOPY, KNEE      OTHER ORTHOPEDIC SURGERY      rt and lft meniscus    OTHER ORTHOPEDIC " "SURGERY      lft toes    AR REMV 2ND CATARACT,CORN-SCLER SECTN      SINUSCOPE      TONSILLECTOMY      US-NEEDLE CORE BX-BREAST PANEL         Family History   Problem Relation Age of Onset    Lung Cancer Father     Hypertension Brother     No Known Problems Daughter     Sleep Apnea Neg Hx        Allergies as of 04/25/2023 - Reviewed 04/25/2023   Allergen Reaction Noted    Codeine  01/12/2010        Vitals:  /60 (BP Location: Left arm, Patient Position: Sitting, BP Cuff Size: Adult)   Pulse 75   Resp 16   Ht 1.6 m (5' 3\")   Wt 66.2 kg (146 lb)   SpO2 96%     Current medications as of today   Current Outpatient Medications   Medication Sig Dispense Refill    SYNTHROID 50 MCG Tab       Cetirizine HCl (ZYRTEC ALLERGY) 10 MG Cap Take 10 mg by mouth every day.      levothyroxine (SYNTHROID) 25 MCG Tab Synthroid 25 mcg tablet   Take 2 tablets every day by oral route.      loperamide (IMODIUM) 1 MG/7.5ML Suspension Take 1 mg by mouth 4 times a day as needed for Diarrhea. Taking once a day.      alendronate (FOSAMAX) 70 MG Tab Take 70 mg by mouth every 7 days.      latanoprost (XALATAN) 0.005 % Solution Administer 1 Drop into the right eye every day.      sertraline (ZOLOFT) 100 MG Tab Take 1 Tab by mouth every day.      amoxicillin (AMOXIL) 500 MG Cap amoxicillin 500 mg capsule      propranolol (INDERAL) 10 MG Tab 10 mg 2 times a day. Pt taking PRN      Acetaminophen (TYLENOL 8 HOUR PO) Take 1,000 mg by mouth 4 times a day as needed.      EPINEPHrine (EPIPEN) 0.3 MG/0.3ML Solution Auto-injector solution for injection       anastrozole (ARIMIDEX) 1 MG Tab Take 1 Tablet by mouth at bedtime.       No current facility-administered medications for this visit.         Physical Exam:   Gen:           Alert and oriented, No apparent distress. Mood and affect appropriate, normal interaction with examiner.  Eyes:          PERRL, EOM intact, sclere white, conjunctive moist.  Ears:          Not examined.   Hearing:   "   Grossly intact.  Nose:          Normal, no lesions or deformities.  Dentition:    Good dentition.  Oropharynx:   Tongue normal, posterior pharynx without erythema or exudate.  Neck:        Supple, trachea midline, no masses.  Respiratory Effort: No intercostal retractions or use of accessory muscles.   Lung Auscultation:      Clear to auscultation bilaterally; no rales, rhonchi or wheezing.  CV:            Regular rate and rhythm. No murmurs, rubs or gallops.  Abd:           Not examined.   Lymphadenopathy: Not examined.  Gait and Station: Normal.  Digits and Nails: No clubbing, cyanosis, petechiae, or nodes.   Cranial Nerves: II-XII grossly intact.  Skin:        No rashes, lesions or ulcers noted.               Ext:           No cyanosis or edema.      Assessment:  1. CLAUDIA (obstructive sleep apnea)            Plan:  Patient comes in today for for compliance recheck.  I am turning her humidification off due to increased chest congestion and sinus congestion due to water getting into the tubing while using CPAP.  Overall she notes improved sleep quality using CPAP, but will return in 4 weeks for reassessment.  If patient continues to have problems, may proceed with sleep study.  There is remaining at 8 to 10 cm/H2O.  Will correct condensation problem first.    Please note that this dictation was created using voice recognition software. I have made every reasonable attempt to correct obvious errors, but it is possible there are errors of grammar and possibly content that I did not discover before finalizing the note.

## 2023-06-21 ENCOUNTER — APPOINTMENT (OUTPATIENT)
Dept: SLEEP MEDICINE | Facility: MEDICAL CENTER | Age: 78
End: 2023-06-21
Attending: NURSE PRACTITIONER
Payer: MEDICARE

## 2023-06-22 ENCOUNTER — OFFICE VISIT (OUTPATIENT)
Dept: SLEEP MEDICINE | Facility: MEDICAL CENTER | Age: 78
End: 2023-06-22
Attending: NURSE PRACTITIONER
Payer: MEDICARE

## 2023-06-22 VITALS
HEIGHT: 64 IN | HEART RATE: 78 BPM | SYSTOLIC BLOOD PRESSURE: 112 MMHG | BODY MASS INDEX: 24.55 KG/M2 | WEIGHT: 143.8 LBS | RESPIRATION RATE: 16 BRPM | OXYGEN SATURATION: 94 % | DIASTOLIC BLOOD PRESSURE: 62 MMHG

## 2023-06-22 DIAGNOSIS — G47.33 OSA (OBSTRUCTIVE SLEEP APNEA): ICD-10-CM

## 2023-06-22 PROCEDURE — 3078F DIAST BP <80 MM HG: CPT | Performed by: NURSE PRACTITIONER

## 2023-06-22 PROCEDURE — 99213 OFFICE O/P EST LOW 20 MIN: CPT | Performed by: NURSE PRACTITIONER

## 2023-06-22 PROCEDURE — 99212 OFFICE O/P EST SF 10 MIN: CPT | Performed by: NURSE PRACTITIONER

## 2023-06-22 PROCEDURE — 3074F SYST BP LT 130 MM HG: CPT | Performed by: NURSE PRACTITIONER

## 2023-06-22 RX ORDER — NITROFURANTOIN 25; 75 MG/1; MG/1
CAPSULE ORAL
COMMUNITY
End: 2024-01-26

## 2023-06-22 RX ORDER — FLUCONAZOLE 150 MG/1
TABLET ORAL
COMMUNITY
End: 2024-01-26

## 2023-06-22 RX ORDER — FLUCONAZOLE 150 MG/1
TABLET ORAL
COMMUNITY
Start: 2023-05-22 | End: 2024-01-26

## 2023-06-22 RX ORDER — TOPIRAMATE 25 MG/1
TABLET ORAL
COMMUNITY
Start: 2023-06-08

## 2023-06-22 RX ORDER — NITROFURANTOIN 25; 75 MG/1; MG/1
CAPSULE ORAL
COMMUNITY
Start: 2023-05-22 | End: 2024-01-26

## 2023-06-22 ASSESSMENT — FIBROSIS 4 INDEX: FIB4 SCORE: 3.02

## 2023-06-22 NOTE — PROGRESS NOTES
"No chief complaint on file.      HPI:  Roseline Redding is a 78 y.o. year old female here today for follow-up on CLAUDIA.  Last seen 4/25/2023 by me. Past medical history includes TMJ, hypothyroidism, GERD, bruxism, allergic rhinitis, and obstructive sleep apnea on CPAP.    Patient comes in today for compliance recheck.  She was previously not using her CPAP as recommended by treatment standards.  Most recent changes to CPAP were to auto CPAP 8 to 10 cm/H2O.  Patient was having difficulty with water getting into the tubing so heated tubing and humidification were adjusted accordingly.  Increased condensation in tubing because patient to not use CPAP as much as recommended.    Patient is currently using nasal pillows and denies any difficulty with mask fit or pressures.  It is experiencing no more condensation with tubing.  She gets an average of 8 hours of sleep and denies any difficulty with mask fit or pressures or falling or staying asleep.  She overall notes improvement in sleep quality.  She states that sometimes she falls asleep for putting CPAP on.    Today compliance reviewed with patient shows 86.7% use with an average time of 4 hours and 27 minutes and a resultant AHI of 4.8 with no evidence of excessive mask leak.      Sleep History  5/10/2017 PSG split-night study showed severe obstructive sleep apnea overall AHI 50, minimum oxygen saturation 83%, responded well to CPAP therapy.  Recommended pressure of 6 cm water.    ROS: As per HPI and otherwise negative if not stated.    Past Medical History:   Diagnosis Date    Allergic rhinitis     Arrhythmia     MVP    Arthritis     knees/rt thumb    Breast cancer (HCC)     Bronchitis 2019    Bruxism     Cancer (HCC) 2012    breast, left    CATARACT     Bilateral IOL    Chickenpox     GERD (gastroesophageal reflux disease)     [place] measles     Glaucoma     Bilateral    Heart valve disease     MVP    Hemorrhagic disorder (HCC)     \"Low platelets\"    Hypothyroidism  "    Influenza     Mumps     Pain 10/04/2022    3-4/10    Restless leg syndrome     Sleep apnea     Snoring     TMJ (dislocation of temporomandibular joint)     Tonsillitis     Unspecified disorder of thyroid     Unspecified urinary incontinence     sneezing/coughing    Whooping cough     Whooping cough 1950       Past Surgical History:   Procedure Laterality Date    PB RECONSTRUCT RADIAL HEAD W IMPLANT Left 10/5/2022    Procedure: LEFT RADIAL HEAD ARTHROPLASTY;  Surgeon: Dequan Huston M.D.;  Location: SURGERY [facility];  Service: Orthopedics    OK COLONOSCOPY,DIAGNOSTIC  8/18/2021    Procedure: COLONOSCOPY  - WITH BIOPSIES and SNARE POLYPECTOMY;  Surgeon: Shravan Todd M.D.;  Location: SURGERY [facility];  Service: EUS    OK COLONOSCOPY W/ENDOSCOPE US  8/18/2021    Procedure: COLONOSCOPY, FLEXIBLE, WITH ENDOSCOPIC US - OF ANAL SPHINCTER;  Surgeon: Shravan Todd M.D.;  Location: [facility];  Service: EUS    PB TOTAL KNEE ARTHROPLASTY Right 6/7/2021    Procedure: RIGHT TOTAL KNEE ARTHROPLASTY;  Surgeon: Andrade Kwok M.D.;  Location: [facility];  Service: Orthopedics    KNEE ARTHROPLASTY TOTAL Right 06/2021    KNEE ARTHROPLASTY TOTAL Left 2019    STEREOTACTIC BIOPSY Left 01/26/2017    LUMPECTOMY  10/19/2012    Performed by Efrain Eagle M.D. at SURGERY [facility]    NODE BIOPSY SENTINEL  10/19/2012    Performed by Efrain Eagle M.D. at SURGERY [facility]    FINGER ARTHROPLASTY  1/15/2010    Performed by EFRAIN WILKINS at SURGERY SAME DAY [facility]S    TENDON TRANSFER  1/15/2010    Performed by EFRAIN WILKINS at SURGERY [facility]    OTHER  2006    face lift    OTHER  2003    sinus     ARTHROSCOPY, KNEE      OTHER ORTHOPEDIC SURGERY      rt and lft meniscus    OTHER ORTHOPEDIC SURGERY      lft toes    OK REMV 2ND CATARACT,CORN-SCLER SECTN      SINUSCOPE      TONSILLECTOMY      US-NEEDLE CORE BX-BREAST PANEL         Family History    Problem Relation Age of Onset    Lung Cancer Father     Hypertension Brother     No Known Problems Daughter     Sleep Apnea Neg Hx        Allergies as of 06/22/2023 - Reviewed 04/25/2023   Allergen Reaction Noted    Codeine  01/12/2010        Vitals:  There were no vitals taken for this visit.    Current medications as of today   Current Outpatient Medications   Medication Sig Dispense Refill    SYNTHROID 50 MCG Tab       amoxicillin (AMOXIL) 500 MG Cap amoxicillin 500 mg capsule      Cetirizine HCl (ZYRTEC ALLERGY) 10 MG Cap Take 10 mg by mouth every day.      levothyroxine (SYNTHROID) 25 MCG Tab Synthroid 25 mcg tablet   Take 2 tablets every day by oral route.      propranolol (INDERAL) 10 MG Tab 10 mg 2 times a day. Pt taking PRN      loperamide (IMODIUM) 1 MG/7.5ML Suspension Take 1 mg by mouth 4 times a day as needed for Diarrhea. Taking once a day.      Acetaminophen (TYLENOL 8 HOUR PO) Take 1,000 mg by mouth 4 times a day as needed.      alendronate (FOSAMAX) 70 MG Tab Take 70 mg by mouth every 7 days.      EPINEPHrine (EPIPEN) 0.3 MG/0.3ML Solution Auto-injector solution for injection       latanoprost (XALATAN) 0.005 % Solution Administer 1 Drop into the right eye every day.      anastrozole (ARIMIDEX) 1 MG Tab Take 1 Tablet by mouth at bedtime.      sertraline (ZOLOFT) 100 MG Tab Take 1 Tab by mouth every day.       No current facility-administered medications for this visit.         Physical Exam:   Gen:           Alert and oriented, No apparent distress. Mood and affect appropriate, normal interaction with examiner.  Eyes:          PERRL, EOM intact, sclere white, conjunctive moist.  Ears:          Not examined.   Hearing:     Grossly intact.  Nose:          Normal, no lesions or deformities.  Dentition:    Good dentition.  Oropharynx:   Tongue normal, posterior pharynx without erythema or exudat  Neck:        Supple, trachea midline, no masses.  Respiratory Effort: No intercostal retractions or use of  accessory muscles.   Lung Auscultation:      Clear to auscultation bilaterally; no rales, rhonchi or wheezing.  CV:            Regular rate and rhythm. No murmurs, rubs or gallops.  Abd:           Not examined.   Lymphadenopathy: Not examined.  Gait and Station: Normal.  Digits and Nails: No clubbing, cyanosis, petechiae, or nodes.   Cranial Nerves: II-XII grossly intact.  Skin:        No rashes, lesions or ulcers noted.               Ext:           No cyanosis or edema.      Assessment:  1. CLAUDIA (obstructive sleep apnea)            Plan:  Patient is using and benefiting from CPAP therapy.  Compliance shows adequate use and control of CLAUDIA.  We will follow-up in 6 months for annual CLAUDIA, but can be seen sooner if needed.    Please note that this dictation was created using voice recognition software. I have made every reasonable attempt to correct obvious errors, but it is possible there are errors of grammar and possibly content that I did not discover before finalizing the note.

## 2023-11-18 ENCOUNTER — OFFICE VISIT (OUTPATIENT)
Dept: URGENT CARE | Facility: CLINIC | Age: 78
End: 2023-11-18
Payer: MEDICARE

## 2023-11-18 VITALS
DIASTOLIC BLOOD PRESSURE: 74 MMHG | HEIGHT: 62 IN | TEMPERATURE: 97.8 F | OXYGEN SATURATION: 94 % | WEIGHT: 144 LBS | HEART RATE: 76 BPM | SYSTOLIC BLOOD PRESSURE: 116 MMHG | BODY MASS INDEX: 26.5 KG/M2 | RESPIRATION RATE: 20 BRPM

## 2023-11-18 DIAGNOSIS — U07.1 COVID-19: ICD-10-CM

## 2023-11-18 DIAGNOSIS — J02.9 PHARYNGITIS, UNSPECIFIED ETIOLOGY: ICD-10-CM

## 2023-11-18 DIAGNOSIS — J32.9 RHINOSINUSITIS: ICD-10-CM

## 2023-11-18 LAB
FLUAV RNA SPEC QL NAA+PROBE: NEGATIVE
FLUBV RNA SPEC QL NAA+PROBE: NEGATIVE
RSV RNA SPEC QL NAA+PROBE: NEGATIVE
SARS-COV-2 RNA RESP QL NAA+PROBE: POSITIVE

## 2023-11-18 PROCEDURE — 3078F DIAST BP <80 MM HG: CPT | Performed by: FAMILY MEDICINE

## 2023-11-18 PROCEDURE — 3074F SYST BP LT 130 MM HG: CPT | Performed by: FAMILY MEDICINE

## 2023-11-18 PROCEDURE — 0241U POCT CEPHEID COV-2, FLU A/B, RSV - PCR: CPT | Mod: GZ | Performed by: FAMILY MEDICINE

## 2023-11-18 PROCEDURE — 99213 OFFICE O/P EST LOW 20 MIN: CPT | Performed by: FAMILY MEDICINE

## 2023-11-18 RX ORDER — LIDOCAINE HYDROCHLORIDE 20 MG/ML
15 SOLUTION OROPHARYNGEAL EVERY 4 HOURS PRN
Qty: 120 ML | Refills: 0 | Status: SHIPPED | OUTPATIENT
Start: 2023-11-18 | End: 2024-01-26

## 2023-11-18 RX ORDER — AMOXICILLIN AND CLAVULANATE POTASSIUM 875; 125 MG/1; MG/1
1 TABLET, FILM COATED ORAL 2 TIMES DAILY
Qty: 14 TABLET | Refills: 0 | Status: SHIPPED | OUTPATIENT
Start: 2023-11-18 | End: 2023-11-25

## 2023-11-18 ASSESSMENT — ENCOUNTER SYMPTOMS
MYALGIAS: 0
VOMITING: 0
WEIGHT LOSS: 0
EYE DISCHARGE: 0
NAUSEA: 0
EYE REDNESS: 0

## 2023-11-18 ASSESSMENT — FIBROSIS 4 INDEX: FIB4 SCORE: 3.02

## 2023-11-18 NOTE — PROGRESS NOTES
"Subjective     Roselineheron Redding is a 78 y.o. female who presents with Pharyngitis (today)            Onset yesterday ST, sinus pressure/nasal congestion, hoarse voice. No cough. No known exposures.  PMH sinusitis.  No other aggravating or alleviating factors.          Review of Systems   Constitutional:  Positive for malaise/fatigue. Negative for weight loss.   Eyes:  Negative for discharge and redness.   Gastrointestinal:  Negative for nausea and vomiting.   Musculoskeletal:  Negative for joint pain and myalgias.   Skin:  Negative for itching and rash.              Objective     /74   Pulse 76   Temp 36.6 °C (97.8 °F) (Temporal)   Resp 20   Ht 1.575 m (5' 2\")   Wt 65.3 kg (144 lb)   SpO2 94%   BMI 26.34 kg/m²      Physical Exam  Constitutional:       General: She is not in acute distress.     Appearance: She is well-developed.   HENT:      Head: Normocephalic and atraumatic.      Right Ear: Tympanic membrane normal.      Left Ear: Tympanic membrane normal.      Nose: Congestion present.      Mouth/Throat:      Mouth: Mucous membranes are moist.      Pharynx: Posterior oropharyngeal erythema present.      Comments: PND  Eyes:      Conjunctiva/sclera: Conjunctivae normal.   Cardiovascular:      Rate and Rhythm: Normal rate and regular rhythm.      Heart sounds: Normal heart sounds. No murmur heard.  Pulmonary:      Effort: Pulmonary effort is normal.      Breath sounds: Normal breath sounds. No wheezing.   Musculoskeletal:      Cervical back: Neck supple.   Lymphadenopathy:      Cervical: No cervical adenopathy.   Skin:     General: Skin is warm and dry.      Findings: No rash.   Neurological:      Mental Status: She is alert.                             Assessment & Plan         1. Rhinosinusitis  POCT CEPHEID COV-2, FLU A/B, RSV - PCR    amoxicillin-clavulanate (AUGMENTIN) 875-125 MG Tab      2. Pharyngitis, unspecified etiology  POCT CEPHEID COV-2, FLU A/B, RSV - PCR    lidocaine (XYLOCAINE) 2 % " Solution        Differential diagnosis, natural history, supportive care, and indications for immediate follow-up were discussed.     Follow-up studies    Nasal saline, decongestant, nasal corticosteroid.    Contingent antibiotic prescription given to patient to fill upon meeting criteria of guidelines discussed.

## 2023-11-29 ENCOUNTER — PATIENT MESSAGE (OUTPATIENT)
Dept: HEALTH INFORMATION MANAGEMENT | Facility: OTHER | Age: 78
End: 2023-11-29

## 2023-12-14 ENCOUNTER — HOSPITAL ENCOUNTER (OUTPATIENT)
Dept: RADIOLOGY | Facility: MEDICAL CENTER | Age: 78
End: 2023-12-14
Attending: STUDENT IN AN ORGANIZED HEALTH CARE EDUCATION/TRAINING PROGRAM
Payer: MEDICARE

## 2023-12-14 DIAGNOSIS — R31.29 MICROSCOPIC HEMATURIA: ICD-10-CM

## 2023-12-14 PROCEDURE — 74178 CT ABD&PLV WO CNTR FLWD CNTR: CPT

## 2023-12-14 PROCEDURE — 700117 HCHG RX CONTRAST REV CODE 255: Performed by: STUDENT IN AN ORGANIZED HEALTH CARE EDUCATION/TRAINING PROGRAM

## 2023-12-14 RX ADMIN — IOHEXOL 100 ML: 350 INJECTION, SOLUTION INTRAVENOUS at 14:28

## 2023-12-29 ENCOUNTER — HOSPITAL ENCOUNTER (OUTPATIENT)
Dept: RADIOLOGY | Facility: MEDICAL CENTER | Age: 78
End: 2023-12-29
Attending: STUDENT IN AN ORGANIZED HEALTH CARE EDUCATION/TRAINING PROGRAM
Payer: MEDICARE

## 2023-12-29 DIAGNOSIS — N13.5 CROSSING VESSEL AND STRICTURE OF URETER WITHOUT HYDRONEPHROSIS: ICD-10-CM

## 2023-12-29 PROCEDURE — 78708 K FLOW/FUNCT IMAGE W/DRUG: CPT

## 2023-12-29 PROCEDURE — 700111 HCHG RX REV CODE 636 W/ 250 OVERRIDE (IP)

## 2023-12-29 RX ORDER — FUROSEMIDE 10 MG/ML
INJECTION INTRAMUSCULAR; INTRAVENOUS
Status: COMPLETED
Start: 2023-12-29 | End: 2023-12-29

## 2023-12-29 RX ADMIN — FUROSEMIDE 20 MG: 10 INJECTION INTRAMUSCULAR; INTRAVENOUS at 12:15

## 2024-01-12 ENCOUNTER — APPOINTMENT (OUTPATIENT)
Dept: SLEEP MEDICINE | Facility: MEDICAL CENTER | Age: 79
End: 2024-01-12
Attending: NURSE PRACTITIONER
Payer: MEDICARE

## 2024-01-25 PROBLEM — M79.672 PAIN OF LEFT FOOT: Status: ACTIVE | Noted: 2024-01-25

## 2024-02-06 ENCOUNTER — OFFICE VISIT (OUTPATIENT)
Dept: SLEEP MEDICINE | Facility: MEDICAL CENTER | Age: 79
End: 2024-02-06
Attending: NURSE PRACTITIONER
Payer: MEDICARE

## 2024-02-06 VITALS
RESPIRATION RATE: 16 BRPM | SYSTOLIC BLOOD PRESSURE: 120 MMHG | WEIGHT: 136 LBS | HEART RATE: 60 BPM | OXYGEN SATURATION: 96 % | DIASTOLIC BLOOD PRESSURE: 60 MMHG | BODY MASS INDEX: 25.03 KG/M2 | HEIGHT: 62 IN

## 2024-02-06 DIAGNOSIS — G47.33 OSA (OBSTRUCTIVE SLEEP APNEA): ICD-10-CM

## 2024-02-06 PROCEDURE — 3078F DIAST BP <80 MM HG: CPT | Performed by: NURSE PRACTITIONER

## 2024-02-06 PROCEDURE — 99212 OFFICE O/P EST SF 10 MIN: CPT | Performed by: NURSE PRACTITIONER

## 2024-02-06 PROCEDURE — 99214 OFFICE O/P EST MOD 30 MIN: CPT | Performed by: NURSE PRACTITIONER

## 2024-02-06 PROCEDURE — 3074F SYST BP LT 130 MM HG: CPT | Performed by: NURSE PRACTITIONER

## 2024-02-06 ASSESSMENT — PATIENT HEALTH QUESTIONNAIRE - PHQ9: CLINICAL INTERPRETATION OF PHQ2 SCORE: 0

## 2024-02-06 ASSESSMENT — FIBROSIS 4 INDEX: FIB4 SCORE: 3.02

## 2024-02-06 NOTE — PROGRESS NOTES
"Chief Complaint   Patient presents with    Follow-Up     F/V CLAUDIA  LAST SEEN 06/22/2023 NATALY BROWN       HPI:  Roseline Redding is a 78 y.o. year old female here today for follw-up on CLAUDIA.  Last seen by me on 6/22/2023. Past medical history includes TMJ, hypothyroidism, GERD, bruxism, allergic rhinitis, and obstructive sleep apnea on CPAP.     Patient comes in today for other options to treat CLAUDIA.  He is currently not using her CPAP. Patient was having difficulty with water getting into the tubing so heated tubing and humidification were adjusted accordingly.  Increased condensation in tubing resulted in the patient to using CPAP as much as recommended. She gets an average of 8 hours of sleep and denies difficulty falling or staying asleep.  She overall notes improvement in sleep quality.  She states that sometimes she falls asleep for putting CPAP on.     Sleep History  5/10/2017 PSG split-night study showed severe obstructive sleep apnea overall AHI 50, minimum oxygen saturation 83%, responded well to CPAP therapy.  Recommended pressure of 6 cm water.    ROS: As per HPI and otherwise negative if not stated.    Past Medical History:   Diagnosis Date    Allergic rhinitis     Arrhythmia     MVP    Arthritis     knees/rt thumb    Breast cancer (HCC)     Bronchitis 2019    Bruxism     Cancer (HCC) 2012    breast, left    CATARACT     Bilateral IOL    Chickenpox     GERD (gastroesophageal reflux disease)     [place] measles     Glaucoma     Bilateral    Heart valve disease     MVP    Hemorrhagic disorder (HCC)     \"Low platelets\"    Hypothyroidism     Influenza     Mumps     Osteoporosis     Pain 10/04/2022    3-4/10    Restless leg syndrome     Sleep apnea     Snoring     TMJ (dislocation of temporomandibular joint)     Tonsillitis     Unspecified disorder of thyroid     Unspecified urinary incontinence     sneezing/coughing    Whooping cough     Whooping cough 1950       Past Surgical History:   Procedure " Laterality Date    PB RECONSTRUCT RADIAL HEAD W IMPLANT Left 10/5/2022    Procedure: LEFT RADIAL HEAD ARTHROPLASTY;  Surgeon: Dequan Huston M.D.;  Location: SURGERY [facility];  Service: Orthopedics    GA COLONOSCOPY,DIAGNOSTIC  8/18/2021    Procedure: COLONOSCOPY  - WITH BIOPSIES and SNARE POLYPECTOMY;  Surgeon: Shravan Todd M.D.;  Location: [facility];  Service: EUS    GA COLONOSCOPY W/ENDOSCOPE US  8/18/2021    Procedure: COLONOSCOPY, FLEXIBLE, WITH ENDOSCOPIC US - OF ANAL SPHINCTER;  Surgeon: Shravan Todd M.D.;  Location: [facility];  Service: EUS    PB TOTAL KNEE ARTHROPLASTY Right 6/7/2021    Procedure: RIGHT TOTAL KNEE ARTHROPLASTY;  Surgeon: Andrade Kwok M.D.;  Location: [facility];  Service: Orthopedics    KNEE ARTHROPLASTY TOTAL Right 06/2021    KNEE ARTHROPLASTY TOTAL Left 2019    STEREOTACTIC BIOPSY Left 01/26/2017    LUMPECTOMY  10/19/2012    Performed by Efrain Eagle M.D. at SURGERY [facility]S    NODE BIOPSY SENTINEL  10/19/2012    Performed by Efrain Eagle M.D. at SURGERY [facility]S    FINGER ARTHROPLASTY  1/15/2010    Performed by EFRAIN WILKINS [facility]    TENDON TRANSFER  1/15/2010    Performed by EFRAIN WILKINS at [facility]ERY [facility]    OTHER  2006    face lift    OTHER  2003    sinus     ARTHROSCOPY, KNEE      OTHER ORTHOPEDIC SURGERY      rt and lft meniscus    OTHER ORTHOPEDIC SURGERY      lft toes    GA REMV 2ND CATARACT,CORN-SCLER SECTN      SINUSCOPE      TONSILLECTOMY      US-NEEDLE CORE BX-BREAST PANEL         Family History   Problem Relation Age of Onset    Lung Cancer Father     Hypertension Brother     No Known Problems Daughter     Sleep Apnea Neg Hx        Allergies as of 02/06/2024 - Reviewed 02/06/2024   Allergen Reaction Noted    Codeine  01/12/2010        Vitals:  /60 (BP Location: Left arm, Patient Position: Sitting, BP Cuff Size: Adult)   Pulse 60    "Resp 16   Ht 1.575 m (5' 2\")   Wt 61.7 kg (136 lb)   SpO2 96%     Current medications as of today   Current Outpatient Medications   Medication Sig Dispense Refill    Cetirizine HCl (ZYRTEC ALLERGY) 10 MG Cap Take 10 mg by mouth every day.      levothyroxine (SYNTHROID) 25 MCG Tab Synthroid 25 mcg tablet   Take 2 tablets every day by oral route.      loperamide (IMODIUM) 1 MG/7.5ML Suspension Take 1 mg by mouth 4 times a day as needed for Diarrhea. Taking once a day.      latanoprost (XALATAN) 0.005 % Solution Administer 1 Drop into the right eye every day.      sertraline (ZOLOFT) 100 MG Tab Take 1 Tab by mouth every day.      topiramate (TOPAMAX) 25 MG Tab  (Patient not taking: Reported on 2/6/2024)       No current facility-administered medications for this visit.         Physical Exam:   Gen:           Alert and oriented, No apparent distress. Mood and affect appropriate, normal interaction with examiner.  Eyes:          PERRL, EOM intact, sclere white, conjunctive moist.  Ears:          Not examined.   Hearing:     Grossly intact.  Nose:          Normal, no lesions or deformities.  Dentition:    Good dentition.  Oropharynx:   Tongue normal, posterior pharynx without erythema or exudate.  Neck:        Supple, trachea midline, no masses.  Respiratory Effort: No intercostal retractions or use of accessory muscles.   Lung Auscultation:      Clear to auscultation bilaterally; no rales, rhonchi or wheezing.  CV:            Regular rate and rhythm. No murmurs, rubs or gallops.  Abd:           Not examined.   Lymphadenopathy: Not examined.  Gait and Station: Normal.  Digits and Nails: No clubbing, cyanosis, petechiae, or nodes.   Cranial Nerves: II-XII grossly intact.  Skin:        No rashes, lesions or ulcers noted.               Ext:           No cyanosis or edema.      Assessment:  1. CLAUDIA (obstructive sleep apnea)  Overnight Home Sleep Study        Plan:  Patient is unable to tolerate CPAP.  She works and real " estate.  She states after using his CPAP, I will cause increased nasal congestion and rhinorrhea along with postnasal drip.  Order placed for home sleep study to update and reassess severity and diagnosis of CLAUDIA.  Will discuss options once sleep study is completed.    Please note that this dictation was created using voice recognition software. I have made every reasonable attempt to correct obvious errors, but it is possible there are errors of grammar and possibly content that I did not discover before finalizing the note.

## 2024-04-02 ENCOUNTER — APPOINTMENT (OUTPATIENT)
Dept: SLEEP MEDICINE | Facility: MEDICAL CENTER | Age: 79
End: 2024-04-02
Attending: NURSE PRACTITIONER
Payer: MEDICARE

## 2024-04-02 DIAGNOSIS — G47.33 OSA (OBSTRUCTIVE SLEEP APNEA): ICD-10-CM

## 2024-04-02 PROCEDURE — G0400 HOME SLEEP TEST/TYPE 4 PORTA: HCPCS | Performed by: STUDENT IN AN ORGANIZED HEALTH CARE EDUCATION/TRAINING PROGRAM

## 2024-04-09 NOTE — PROCEDURES
DIAGNOSTIC HOME SLEEP TEST (HST) REPORT WatchPAT      PATIENT ID:  NAME:  Roseline Redding  MRN:               [id number]  YOB: 1945  DATE OF STUDY: 4/2/2024      Impression:     This study shows evidence of:      1. Moderate obstructive sleep apnea with 4% PAT apnea hypopnea index(pAHI) of 26.7 per hour.  PAT respiratory disturbance index (pRDI) was 41.6 per hour. These findings are based on 7 channels recording of PAT signal with sleep staging, heart rate, pulse oximetry, actigraphy, body position, snoring and respiratory movement.     2. Oxygenation O2 Sat. mean O2 sat was 92%,  jeannie was 63%,  and maximum O2 at 97 %. O2 sat was at or  below 88% for 4 min of evaluation time. Oxygen Desaturation (>=4%) Index was 23.4/hr. AVG HR was 59 BPM.      TECHNICAL DESCRIPTION: Patient underwent home sleep apnea testing with peripheral arterial tone signal (WatchPAT™). This is a Type IV portable monitor and device per Medicare. Monitoring was done with 7 channels recording of PAT signal with sleep staging, heart rate, pulse oximetry, actigraphy, body position, snoring and respiratory movement. Prior to using the device, the patient received verbal and written instructions for its application and was provided with the help desk phone number for additional telephonic instruction with 24-hour availability of qualified personnel to answer questions.    Respiratory events:      General sleep summary: . Total recording time is 7 hours and 10 minutes and total Sleep time is 6 hours and 14 minutes. The patient spent 74.8 minutes in the supine position and 299.5 minutes in the nonsupine position.      Recommendations:    1. CPAP titration study vs Auto CPAP trial.   2.   In general patients with sleep apnea are advised to avoid alcohol and sedatives and to not operate a motor vehicle while drowsy. In some cases alternative treatment options may prove effective in resolving sleep apnea in these options  include upper airway surgery, the use of a dental orthotic or weight loss and positional therapy. Clinical correlation is required.         Star Del Rio MD

## 2024-04-19 ENCOUNTER — OFFICE VISIT (OUTPATIENT)
Dept: SLEEP MEDICINE | Facility: MEDICAL CENTER | Age: 79
End: 2024-04-19
Attending: NURSE PRACTITIONER
Payer: MEDICARE

## 2024-04-19 VITALS
HEART RATE: 66 BPM | OXYGEN SATURATION: 95 % | BODY MASS INDEX: 23.62 KG/M2 | HEIGHT: 63 IN | SYSTOLIC BLOOD PRESSURE: 130 MMHG | DIASTOLIC BLOOD PRESSURE: 60 MMHG | RESPIRATION RATE: 16 BRPM | WEIGHT: 133.3 LBS

## 2024-04-19 DIAGNOSIS — G47.33 OSA (OBSTRUCTIVE SLEEP APNEA): ICD-10-CM

## 2024-04-19 PROCEDURE — 3078F DIAST BP <80 MM HG: CPT | Performed by: NURSE PRACTITIONER

## 2024-04-19 PROCEDURE — 99214 OFFICE O/P EST MOD 30 MIN: CPT | Performed by: NURSE PRACTITIONER

## 2024-04-19 PROCEDURE — 3075F SYST BP GE 130 - 139MM HG: CPT | Performed by: NURSE PRACTITIONER

## 2024-04-19 PROCEDURE — 99212 OFFICE O/P EST SF 10 MIN: CPT | Performed by: NURSE PRACTITIONER

## 2024-04-19 ASSESSMENT — FIBROSIS 4 INDEX: FIB4 SCORE: 3.06

## 2024-04-19 NOTE — PROGRESS NOTES
Chief Complaint   Patient presents with    Follow-Up     Last seen 02/06/2024 Harsh Vogel   RESULTS       HPI:  Roseline Redding is a 79 y.o. year old female here today for follow-up on CLAUDIA.  Last seen by me on 2/6/2024. Past medical history includes TMJ, hypothyroidism, GERD, bruxism, allergic rhinitis, and obstructive sleep apnea on CPAP.      Patient comes in today for other options to treat CLAUDIA.  She is currently not using her CPAP. Patient was having difficulty with water getting into the tubing so heated tubing and humidification were adjusted accordingly.  Increased condensation in tubing resulted in the patient not using CPAP as much as recommended. She gets an average of 8 hours of sleep and denies difficulty falling or staying asleep.  She overall notes improvement in sleep quality.  She states that sometimes she falls asleep for putting CPAP on.    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 Order placed for home sleep study to update and reassess severity and diagnosis of CLAUDIA.     Home sleep study (4/2/2024):   1. Moderate obstructive sleep apnea with 4% PAT apnea hypopnea index(pAHI) of 26.7 per hour.  PAT respiratory disturbance index (pRDI) was 41.6 per hour. These findings are based on 7 channels recording of PAT signal with sleep staging, heart rate, pulse oximetry, actigraphy, body position, snoring and respiratory movement.      2. Oxygenation O2 Sat. mean O2 sat was 92%,  jeannie was 63%,  and maximum O2 at 97 %. O2 sat was at or  below 88% for 4 min of evaluation time. Oxygen Desaturation (>=4%) Index was 23.4/hr. AVG HR was 59 BPM.        Sleep History  5/10/2017 PSG split-night study showed severe obstructive sleep apnea overall AHI 50, minimum oxygen saturation 83%, responded well to CPAP therapy.  Recommended pressure of 6 cm water.     ROS: As per HPI  "and otherwise negative if not stated.    Past Medical History:   Diagnosis Date    Allergic rhinitis     Arrhythmia     MVP    Arthritis     knees/rt thumb    Breast cancer (HCC)     Bronchitis 2019    Bruxism     Cancer (HCC) 2012    breast, left    CATARACT     Bilateral IOL    Chickenpox     GERD (gastroesophageal reflux disease)     Korean measles     Glaucoma     Bilateral    Heart valve disease     MVP    Hemorrhagic disorder (HCC)     \"Low platelets\"    Hypothyroidism     Influenza     Mumps     Osteoporosis     Pain 10/04/2022    3-4/10    Restless leg syndrome     Sleep apnea     Snoring     TMJ (dislocation of temporomandibular joint)     Tonsillitis     Unspecified disorder of thyroid     Unspecified urinary incontinence     sneezing/coughing    Whooping cough     Whooping cough 1950       Past Surgical History:   Procedure Laterality Date    PB RECONSTRUCT RADIAL HEAD W IMPLANT Left 10/5/2022    Procedure: LEFT RADIAL HEAD ARTHROPLASTY;  Surgeon: Dequan Huston M.D.;  Location: [facility];  Service: Orthopedics    TX COLONOSCOPY,DIAGNOSTIC  8/18/2021    Procedure: COLONOSCOPY  - WITH BIOPSIES and SNARE POLYPECTOMY;  Surgeon: Shravan Todd M.D.;  Location: [facility];  Service: EUS    TX COLONOSCOPY W/ENDOSCOPE US  8/18/2021    Procedure: COLONOSCOPY, FLEXIBLE, WITH ENDOSCOPIC US - OF ANAL SPHINCTER;  Surgeon: Shravan Todd M.D.;  Location: [facility];  Service: EUS    PB TOTAL KNEE ARTHROPLASTY Right 6/7/2021    Procedure: RIGHT TOTAL KNEE ARTHROPLASTY;  Surgeon: Andrade Kwok M.D.;  Location: [facility];  Service: Orthopedics    KNEE ARTHROPLASTY TOTAL Right 06/2021    KNEE ARTHROPLASTY TOTAL Left 2019    STEREOTACTIC BIOPSY Left 01/26/2017    LUMPECTOMY  10/19/2012    Performed by Vishal Eagle M.D. at SURGERY [facility]    NODE BIOPSY SENTINEL  10/19/2012    Performed by Vishal Eagle M.D. at SURGERY [facility]    FINGER " "ARTHROPLASTY  1/15/2010    Performed by EFRAIN WILKINS at SURGERY [facility]    TENDON TRANSFER  1/15/2010    Performed by EFRAIN WILKINS at [facility]ERY [facility]    OTHER  2006    face lift    OTHER  2003    sinus     ARTHROSCOPY, KNEE      OTHER ORTHOPEDIC SURGERY      rt and lft meniscus    OTHER ORTHOPEDIC SURGERY      lft toes    MA REMV 2ND CATARACT,CORN-SCLER SECTN      SINUSCOPE      TONSILLECTOMY      US-NEEDLE CORE BX-BREAST PANEL         Family History   Problem Relation Age of Onset    Lung Cancer Father     Hypertension Brother     No Known Problems Daughter     Sleep Apnea Neg Hx        Allergies as of 04/19/2024 - Reviewed 04/19/2024   Allergen Reaction Noted    Codeine  01/12/2010        Vitals:  /60 (BP Location: Left arm, Patient Position: Sitting, BP Cuff Size: Adult)   Pulse 66   Resp 16   Ht 1.6 m (5' 3\")   Wt 60.5 kg (133 lb 4.8 oz)   SpO2 95%     Current medications as of today   Current Outpatient Medications   Medication Sig Dispense Refill    Cetirizine HCl (ZYRTEC ALLERGY) 10 MG Cap Take 10 mg by mouth every day.      levothyroxine (SYNTHROID) 25 MCG Tab Synthroid 25 mcg tablet   Take 2 tablets every day by oral route.      loperamide (IMODIUM) 1 MG/7.5ML Suspension Take 1 mg by mouth 4 times a day as needed for Diarrhea. Taking once a day.      latanoprost (XALATAN) 0.005 % Solution Administer 1 Drop into the right eye every day.      sertraline (ZOLOFT) 100 MG Tab Take 1 Tab by mouth every day.      topiramate (TOPAMAX) 25 MG Tab        No current facility-administered medications for this visit.         Physical Exam:   Gen:           Alert and oriented, No apparent distress. Mood and affect appropriate, normal interaction with examiner.  Eyes:          PERRL, EOM intact, sclere white, conjunctive moist.  Ears:          Not examined.   Hearing:     Grossly intact.  Nose:          Normal, no lesions or deformities.  Dentition:    Good " dentition.  Oropharynx:   Tongue normal, posterior pharynx without erythema or exudate.  Neck:        Supple, trachea midline, no masses.  Respiratory Effort: No intercostal retractions or use of accessory muscles.   Lung Auscultation:      Clear to auscultation bilaterally; no rales, rhonchi or wheezing.  CV:            Regular rate and rhythm. No murmurs, rubs or gallops.  Abd:           Not examined.   Lymphadenopathy: Not examined.  Gait and Station: Normal.  Digits and Nails: No clubbing, cyanosis, petechiae, or nodes.   Cranial Nerves: II-XII grossly intact.  Skin:        No rashes, lesions or ulcers noted.               Ext:           No cyanosis or edema.      Assessment:  1. CLAUDIA (obstructive sleep apnea)          Plan:  Patient is unable to tolerate CPAP. 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 Order placed for home sleep study to update and reassess severity and diagnosis of CLAUDIA.  Able to referral for ENT for inspire device implant consultation.  Referral placed.  Patient will follow-up with us as needed and message via [employer] for any concerns.      Please note that this dictation was created using voice recognition software. I have made every reasonable attempt to correct obvious errors, but it is possible there are errors of grammar and possibly content that I did not discover before finalizing the note.

## 2024-06-24 ENCOUNTER — OFFICE VISIT (OUTPATIENT)
Dept: URGENT CARE | Facility: CLINIC | Age: 79
End: 2024-06-24
Payer: MEDICARE

## 2024-06-24 VITALS
DIASTOLIC BLOOD PRESSURE: 60 MMHG | WEIGHT: 132 LBS | HEART RATE: 70 BPM | HEIGHT: 63 IN | BODY MASS INDEX: 23.39 KG/M2 | SYSTOLIC BLOOD PRESSURE: 124 MMHG | TEMPERATURE: 98.1 F | RESPIRATION RATE: 18 BRPM | OXYGEN SATURATION: 97 %

## 2024-06-24 DIAGNOSIS — J01.90 ACUTE NON-RECURRENT SINUSITIS, UNSPECIFIED LOCATION: ICD-10-CM

## 2024-06-24 PROCEDURE — 99213 OFFICE O/P EST LOW 20 MIN: CPT | Performed by: NURSE PRACTITIONER

## 2024-06-24 PROCEDURE — 3078F DIAST BP <80 MM HG: CPT | Performed by: NURSE PRACTITIONER

## 2024-06-24 PROCEDURE — 3074F SYST BP LT 130 MM HG: CPT | Performed by: NURSE PRACTITIONER

## 2024-06-24 RX ORDER — AMOXICILLIN AND CLAVULANATE POTASSIUM 875; 125 MG/1; MG/1
1 TABLET, FILM COATED ORAL 2 TIMES DAILY
Qty: 14 TABLET | Refills: 1 | Status: SHIPPED | OUTPATIENT
Start: 2024-06-24 | End: 2024-07-01

## 2024-06-24 RX ORDER — FLUTICASONE PROPIONATE 50 MCG
SPRAY, SUSPENSION (ML) NASAL
Qty: 9.1 ML | Refills: 0 | Status: SHIPPED | OUTPATIENT
Start: 2024-06-24

## 2024-06-24 ASSESSMENT — ENCOUNTER SYMPTOMS
SINUS PAIN: 1
CARDIOVASCULAR NEGATIVE: 1
FEVER: 0
SHORTNESS OF BREATH: 0
CHILLS: 0
PALPITATIONS: 0
RESPIRATORY NEGATIVE: 1

## 2024-06-24 ASSESSMENT — VISUAL ACUITY: OU: 1

## 2024-06-24 ASSESSMENT — FIBROSIS 4 INDEX: FIB4 SCORE: 3.06

## 2024-06-24 NOTE — PROGRESS NOTES
Subjective:     Roseline Redding is a 79 y.o. female who presents for Sinus Pain (X1wk, sinus pain, runny nose, post nasal drip, fatigue, weak)       Sinus Pain  This is a new problem. The problem has been gradually worsening. Associated symptoms include congestion. Pertinent negatives include no chest pain, chills or fever.     1 week ago, patient started to develop dizziness.  Then started to develop nasal congestion, nasal discharge, and postnasal drainage.  Feels weak.  Otherwise, denies fever, chills, or other symptoms.    Takes Zyrtec daily.    Reports history of sinus infections.    Review of Systems   Constitutional:  Positive for malaise/fatigue. Negative for chills and fever.   HENT:  Positive for congestion and sinus pain. Negative for ear pain.    Respiratory: Negative.  Negative for shortness of breath.    Cardiovascular: Negative.  Negative for chest pain and palpitations.   All other systems reviewed and are negative.    Refer to [facility] for additional details.    During this visit, appropriate PPE was worn, and hand hygiene was performed.    PMH:  has a past medical history of Allergic rhinitis, Arrhythmia, Arthritis, Breast cancer ([facility]), Bronchitis (2019), Bruxism, Cancer ([facility]) (2012), CATARACT, Chickenpox, GERD (gastroesophageal reflux disease), Korean measles, Glaucoma, Heart valve disease, Hemorrhagic disorder (HCC), Hypothyroidism, Influenza, Mumps, Osteoporosis, Pain (10/04/2022), Restless leg syndrome, Sleep apnea, Snoring, TMJ (dislocation of temporomandibular joint), Tonsillitis, Unspecified disorder of thyroid, Unspecified urinary incontinence, Whooping cough, and Whooping cough (1950).    She has no past medical history of Indigestion.    MEDS:   Current Outpatient Medications:     amoxicillin-clavulanate (AUGMENTIN) 875-125 MG Tab, Take 1 Tablet by mouth 2 times a day for 7 days., Disp: 14 Tablet, Rfl: 1    fluticasone (FLONASE) 50 MCG/ACT nasal spray, 1 spray in each nostril 2 times a  day, Disp: 9.1 mL, Rfl: 0    topiramate (TOPAMAX) 25 MG Tab, , Disp: , Rfl:     Cetirizine HCl (ZYRTEC ALLERGY) 10 MG Cap, Take 10 mg by mouth every day., Disp: , Rfl:     levothyroxine (SYNTHROID) 25 MCG Tab, Synthroid 25 mcg tablet  Take 2 tablets every day by oral route., Disp: , Rfl:     loperamide (IMODIUM) 1 MG/7.5ML Suspension, Take 1 mg by mouth 4 times a day as needed for Diarrhea. Taking once a day., Disp: , Rfl:     latanoprost (XALATAN) 0.005 % Solution, Administer 1 Drop into the right eye every day., Disp: , Rfl:     sertraline (ZOLOFT) 100 MG Tab, Take 1 Tab by mouth every day., Disp: , Rfl:     ALLERGIES:   Allergies   Allergen Reactions    Codeine      dizziness     SURGHX:   Past Surgical History:   Procedure Laterality Date    PB RECONSTRUCT RADIAL HEAD W IMPLANT Left 10/5/2022    Procedure: LEFT RADIAL HEAD ARTHROPLASTY;  Surgeon: Dequan Huston M.D.;  Location: [facility];  Service: Orthopedics    DE COLONOSCOPY,DIAGNOSTIC  8/18/2021    Procedure: COLONOSCOPY  - WITH BIOPSIES and SNARE POLYPECTOMY;  Surgeon: Shravan Todd M.D.;  Location: [facility];  Service: EUS    DE COLONOSCOPY W/ENDOSCOPE US  8/18/2021    Procedure: COLONOSCOPY, FLEXIBLE, WITH ENDOSCOPIC US - OF ANAL SPHINCTER;  Surgeon: Shravan Todd M.D.;  Location: [facility];  Service: EUS    PB TOTAL KNEE ARTHROPLASTY Right 6/7/2021    Procedure: RIGHT TOTAL KNEE ARTHROPLASTY;  Surgeon: Andrade Kwok M.D.;  Location: [facility] [place];  Service: Orthopedics    KNEE ARTHROPLASTY TOTAL Right 06/2021    KNEE ARTHROPLASTY TOTAL Left 2019    STEREOTACTIC BIOPSY Left 01/26/2017    LUMPECTOMY  10/19/2012    Performed by Efrain Eagle M.D. at SURGERY [facility]S    NODE BIOPSY SENTINEL  10/19/2012    Performed by Efrain Eagle M.D. at SURGERY [facility]    FINGER ARTHROPLASTY  1/15/2010    Performed by EFRAIN WILKINS at SURGERY [facility]    TENDON TRANSFER   "1/15/2010    Performed by EFRAIN WILKINS at [facility]GERY [facility]    OTHER  2006    face lift    OTHER  2003    sinus     ARTHROSCOPY, KNEE      OTHER ORTHOPEDIC SURGERY      rt and lft meniscus    OTHER ORTHOPEDIC SURGERY      lft toes    [place] REMV 2ND CATARACT,CORN-SCLER SECTN      SINUSCOPE      TONSILLECTOMY      US-NEEDLE CORE BX-BREAST PANEL       SOCHX:  reports that she quit smoking about 41 years ago. Her smoking use included cigarettes. She started smoking about 56 years ago. She has a 22.5 pack-year smoking history. She has never used smokeless tobacco. She reports that she does not currently use alcohol. She reports that she does not currently use drugs.    FH: Per HPI as applicable/pertinent.      Objective:     /60 (BP Location: Left arm, Patient Position: Sitting, BP Cuff Size: Adult)   Pulse 70   Temp 36.7 °C (98.1 °F) (Temporal)   Resp 18   Ht 1.6 m (5' 3\")   Wt 59.9 kg (132 lb)   SpO2 97%   BMI 23.38 kg/m²     Physical Exam  Nursing note reviewed.   Constitutional:       General: She is not in acute distress.     Appearance: She is well-developed. She is not ill-appearing or toxic-appearing.   HENT:      Head: Normocephalic.      Right Ear: Tympanic membrane is bulging (Dull). Tympanic membrane is not perforated or erythematous.      Left Ear: Tympanic membrane is not perforated, erythematous or bulging.      Nose:      Right Sinus: Maxillary sinus tenderness and frontal sinus tenderness present.      Left Sinus: Maxillary sinus tenderness and frontal sinus tenderness present.      Mouth/Throat:      Mouth: Mucous membranes are moist.      Pharynx: Uvula midline. No pharyngeal swelling or posterior oropharyngeal erythema.      Comments: PND  Eyes:      General: Vision grossly intact.   Neck:      Trachea: Phonation normal.   Cardiovascular:      Rate and Rhythm: Normal rate.   Pulmonary:      Effort: Pulmonary effort is normal. No respiratory distress.   Musculoskeletal:    "      General: No deformity. Normal range of motion.   Skin:     General: Skin is warm and dry.      Coloration: Skin is not pale.   Neurological:      Mental Status: She is alert and oriented to person, place, and time.      Motor: No weakness.   Psychiatric:         Behavior: Behavior normal. Behavior is cooperative.       Assessment/Plan:     1. Acute non-recurrent sinusitis, unspecified location  - amoxicillin-clavulanate (AUGMENTIN) 875-125 MG Tab; Take 1 Tablet by mouth 2 times a day for 7 days.  Dispense: 14 Tablet; Refill: 1  - fluticasone (FLONASE) 50 MCG/ACT nasal spray; 1 spray in each nostril 2 times a day  Dispense: 9.1 mL; Refill: 0    Rx as above sent electronically. Continue Zyrtec.    Differential diagnosis, natural history, supportive care, over-the-counter symptom management per 's instructions, close monitoring, and indications for immediate follow-up discussed.     All questions answered. Patient agrees with the plan of care.    Discharge summary provided via "[employer]t.

## 2025-04-11 ENCOUNTER — APPOINTMENT (OUTPATIENT)
Dept: RADIOLOGY | Facility: MEDICAL CENTER | Age: 80
End: 2025-04-11
Attending: PHYSICIAN ASSISTANT
Payer: MEDICARE

## 2025-06-18 ENCOUNTER — APPOINTMENT (OUTPATIENT)
Dept: RADIOLOGY | Facility: MEDICAL CENTER | Age: 80
End: 2025-06-18
Attending: PHYSICIAN ASSISTANT
Payer: MEDICARE

## 2025-07-29 ENCOUNTER — HOSPITAL ENCOUNTER (OUTPATIENT)
Dept: RADIOLOGY | Facility: MEDICAL CENTER | Age: 80
End: 2025-07-29
Attending: PHYSICIAN ASSISTANT
Payer: MEDICARE

## 2025-07-29 ENCOUNTER — HOSPITAL ENCOUNTER (OUTPATIENT)
Dept: RADIOLOGY | Facility: MEDICAL CENTER | Age: 80
End: 2025-07-29
Attending: INTERNAL MEDICINE
Payer: MEDICARE

## 2025-07-29 VITALS — WEIGHT: 140 LBS | HEIGHT: 62 IN | BODY MASS INDEX: 25.76 KG/M2

## 2025-07-29 DIAGNOSIS — M81.0 AGE-RELATED OSTEOPOROSIS WITHOUT CURRENT PATHOLOGICAL FRACTURE: ICD-10-CM

## 2025-07-29 DIAGNOSIS — Z12.31 VISIT FOR SCREENING MAMMOGRAM: ICD-10-CM

## 2025-07-29 PROCEDURE — 77063 BREAST TOMOSYNTHESIS BI: CPT

## 2025-07-29 PROCEDURE — 77080 DXA BONE DENSITY AXIAL: CPT

## (undated) DEVICE — GLOVE BIOGEL SZ 8 SURGICAL PF LTX - (50PR/BX 4BX/CA)

## (undated) DEVICE — TUBE CONNECTING SUCTION - CLEAR PLASTIC STERILE 72 IN (50EA/CA)

## (undated) DEVICE — TOWEL STOP TIMEOUT SAFETY FLAG (40EA/CA)

## (undated) DEVICE — SYRINGE SAFETY 3 ML 18 GA X 1 1/2 BLUNT LL (100/BX 8BX/CA)

## (undated) DEVICE — NEPTUNE 4 PORT MANIFOLD - (20/PK)

## (undated) DEVICE — SENSOR OXIMETER ADULT SPO2 RD SET (20EA/BX)

## (undated) DEVICE — CANISTER SUCTION RIGID RED 1500CC (40EA/CA)

## (undated) DEVICE — SYRINGE SAFETY 10 ML 18 GA X 1 1/2 BLUNT LL (100/BX 4BX/CA)

## (undated) DEVICE — SPONGE GAUZE NON-STERILE 4X4 - (2000/CA 10PK/CA)

## (undated) DEVICE — KIT  I.V. START (100EA/CA)

## (undated) DEVICE — SUCTION INSTRUMENT YANKAUER BULBOUS TIP W/O VENT (50EA/CA)

## (undated) DEVICE — CHLORAPREP 26 ML APPLICATOR - ORANGE TINT(25/CA)

## (undated) DEVICE — FORCEP RADIAL JAW 4 STANDARD CAPACITY W/NEEDLE 240CM (40EA/BX)

## (undated) DEVICE — BAG SPONGE COUNT 10.25 X 32 - BLUE (250/CA)

## (undated) DEVICE — TUBE E-T HI-LO CUFF 6.5MM (10EA/BX)

## (undated) DEVICE — SET EXTENSION WITH 2 PORTS (48EA/CA) ***PART #2C8610 IS A SUBSTITUTE*****

## (undated) DEVICE — SUTURE GENERAL

## (undated) DEVICE — HUMID-VENT HEAT AND MOISTURE EXCHANGE- (50/BX)

## (undated) DEVICE — DEVICE SKIN CLOSURE SURGICAL ZIP 8 (10EA/PK)

## (undated) DEVICE — GLOVE, LITE (PAIR)

## (undated) DEVICE — WRAP CO-FLEX 4IN X 5YD STERIL - SELF-ADHERENT (18/CA)

## (undated) DEVICE — SYRINGE SAFETY 5 ML 18 GA X 1-1/2 BLUNT LL (100/BX 4BX/CA)

## (undated) DEVICE — PADDING CAST 4 IN STERILE - 4 X 4 YDS (24/CA)

## (undated) DEVICE — GLOVE BIOGEL INDICATOR SZ 8 SURGICAL PF LTX - (50/BX 4BX/CA)

## (undated) DEVICE — SUTURE 4-0 ETHILON PS-2 18 (12PK/BX)"

## (undated) DEVICE — GOWN SURGEONS LARGE - (32/CA)

## (undated) DEVICE — SUTURE 2-0 VICRYL PLUS CT-1 36 (36PK/BX)"

## (undated) DEVICE — SUTURE 3-0 MONOCRYL PLUS PS-1 - 27 INCH (36/BX)

## (undated) DEVICE — SODIUM CHL IRRIGATION 0.9% 1000ML (12EA/CA)

## (undated) DEVICE — PACK UPPER EXTREMITY SM OR - (3/CA)

## (undated) DEVICE — LACTATED RINGERS INJ 1000 ML - (14EA/CA 60CA/PF)

## (undated) DEVICE — ELECTRODE DUAL RETURN W/ CORD - (50/PK)

## (undated) DEVICE — CANNULA O2 COMFORT SOFT EAR ADULT 7 FT TUBING (50/CA)

## (undated) DEVICE — SENSOR SPO2 ADULT LNCS ADTX (20/BX) ORDER ITEM #19593

## (undated) DEVICE — SUTURE 0 VICRYL PLUS CT-1 - 36 INCH (36/BX)

## (undated) DEVICE — GOWN WARMING STANDARD FLEX - (30/CA)

## (undated) DEVICE — WATER IRRIGATION STERILE 1000ML (12EA/CA)

## (undated) DEVICE — Device

## (undated) DEVICE — PAD PREP 24 X 48 CUFFED - (100/CA)

## (undated) DEVICE — ELECTRODE 850 FOAM ADHESIVE - HYDROGEL RADIOTRNSPRNT (50/PK)

## (undated) DEVICE — TUBING CLEARLINK DUO-VENT - C-FLO (48EA/CA)